# Patient Record
Sex: FEMALE
[De-identification: names, ages, dates, MRNs, and addresses within clinical notes are randomized per-mention and may not be internally consistent; named-entity substitution may affect disease eponyms.]

---

## 2020-09-09 ENCOUNTER — HOSPITAL ENCOUNTER (OUTPATIENT)
Dept: HOSPITAL 88 - ER | Age: 65
Setting detail: OBSERVATION
LOS: 2 days | Discharge: HOME | End: 2020-09-11
Attending: INTERNAL MEDICINE | Admitting: INTERNAL MEDICINE
Payer: SELF-PAY

## 2020-09-09 VITALS — HEIGHT: 64 IN | BODY MASS INDEX: 20.34 KG/M2 | WEIGHT: 119.12 LBS

## 2020-09-09 DIAGNOSIS — K21.9: ICD-10-CM

## 2020-09-09 DIAGNOSIS — K76.0: ICD-10-CM

## 2020-09-09 DIAGNOSIS — K44.9: ICD-10-CM

## 2020-09-09 DIAGNOSIS — B19.20: ICD-10-CM

## 2020-09-09 DIAGNOSIS — K31.89: ICD-10-CM

## 2020-09-09 DIAGNOSIS — R13.10: ICD-10-CM

## 2020-09-09 DIAGNOSIS — I10: ICD-10-CM

## 2020-09-09 DIAGNOSIS — Z11.59: ICD-10-CM

## 2020-09-09 DIAGNOSIS — K29.70: Primary | ICD-10-CM

## 2020-09-09 LAB
ALBUMIN SERPL-MCNC: 4.8 G/DL (ref 3.5–5)
ALBUMIN/GLOB SERPL: 1 {RATIO} (ref 0.8–2)
ALP SERPL-CCNC: 93 IU/L (ref 40–150)
ALT SERPL-CCNC: 42 IU/L (ref 0–55)
AMYLASE SERPL-CCNC: 42 U/L (ref 25–125)
ANION GAP SERPL CALC-SCNC: 24.7 MMOL/L (ref 8–16)
BACTERIA URNS QL MICRO: (no result) /HPF
BASOPHILS # BLD AUTO: 0 10*3/UL (ref 0–0.1)
BASOPHILS NFR BLD AUTO: 0.1 % (ref 0–1)
BILIRUB UR QL: NEGATIVE
BUN SERPL-MCNC: < 5 MG/DL (ref 7–26)
BUN/CREAT SERPL: 6 (ref 6–25)
CALCIUM SERPL-MCNC: 10.8 MG/DL (ref 8.4–10.2)
CHLORIDE SERPL-SCNC: 81 MMOL/L (ref 98–107)
CK MB SERPL-MCNC: 1.1 NG/ML (ref 0–5)
CK SERPL-CCNC: 136 IU/L (ref 29–168)
CLARITY UR: CLEAR
CO2 SERPL-SCNC: 24 MMOL/L (ref 22–29)
COLOR UR: YELLOW
DEPRECATED NEUTROPHILS # BLD AUTO: 6.2 10*3/UL (ref 2.1–6.9)
DEPRECATED RBC URNS MANUAL-ACNC: (no result) /HPF (ref 0–5)
EGFRCR SERPLBLD CKD-EPI 2021: > 60 ML/MIN (ref 60–?)
EOSINOPHIL # BLD AUTO: 0 10*3/UL (ref 0–0.4)
EOSINOPHIL NFR BLD AUTO: 0 % (ref 0–6)
EPI CELLS URNS QL MICRO: (no result) /LPF
ERYTHROCYTE [DISTWIDTH] IN CORD BLOOD: 12.9 % (ref 11.7–14.4)
GLOBULIN PLAS-MCNC: 4.7 G/DL (ref 2.3–3.5)
GLUCOSE SERPLBLD-MCNC: 177 MG/DL (ref 74–118)
HCT VFR BLD AUTO: 43.4 % (ref 34.2–44.1)
HGB BLD-MCNC: 15.3 G/DL (ref 12–16)
KETONES UR QL STRIP.AUTO: NEGATIVE
LEUKOCYTE ESTERASE UR QL STRIP.AUTO: NEGATIVE
LIPASE SERPL-CCNC: 15 U/L (ref 8–78)
LYMPHOCYTES # BLD: 1.5 10*3/UL (ref 1–3.2)
LYMPHOCYTES NFR BLD AUTO: 16.7 % (ref 18–39.1)
MCH RBC QN AUTO: 33 PG (ref 28–32)
MCHC RBC AUTO-ENTMCNC: 35.3 G/DL (ref 31–35)
MCV RBC AUTO: 93.5 FL (ref 81–99)
MONOCYTES # BLD AUTO: 1 10*3/UL (ref 0.2–0.8)
MONOCYTES NFR BLD AUTO: 11.1 % (ref 4.4–11.3)
NEUTS SEG NFR BLD AUTO: 71.8 % (ref 38.7–80)
NITRITE UR QL STRIP.AUTO: NEGATIVE
PLAT MORPH BLD: (no result)
PLATELET # BLD AUTO: 129 X10E3/UL (ref 140–360)
PLATELET # BLD EST: (no result) 10*3/UL
POTASSIUM SERPL-SCNC: 2.7 MMOL/L (ref 3.5–5.1)
PROT UR QL STRIP.AUTO: (no result)
RBC # BLD AUTO: 4.64 X10E6/UL (ref 3.6–5.1)
RBC MORPH BLD: NORMAL
SODIUM SERPL-SCNC: 127 MMOL/L (ref 136–145)
SP GR UR STRIP: 1.01 (ref 1.01–1.02)
UROBILINOGEN UR STRIP-MCNC: 0.2 MG/DL (ref 0.2–1)
WBC #/AREA URNS HPF: (no result) /HPF (ref 0–5)

## 2020-09-09 PROCEDURE — 93005 ELECTROCARDIOGRAM TRACING: CPT

## 2020-09-09 PROCEDURE — 82150 ASSAY OF AMYLASE: CPT

## 2020-09-09 PROCEDURE — 74177 CT ABD & PELVIS W/CONTRAST: CPT

## 2020-09-09 PROCEDURE — 99284 EMERGENCY DEPT VISIT MOD MDM: CPT

## 2020-09-09 PROCEDURE — 83690 ASSAY OF LIPASE: CPT

## 2020-09-09 PROCEDURE — 70450 CT HEAD/BRAIN W/O DYE: CPT

## 2020-09-09 PROCEDURE — 43450 DILATE ESOPHAGUS 1/MULT PASS: CPT

## 2020-09-09 PROCEDURE — 82550 ASSAY OF CK (CPK): CPT

## 2020-09-09 PROCEDURE — 43239 EGD BIOPSY SINGLE/MULTIPLE: CPT

## 2020-09-09 PROCEDURE — 80053 COMPREHEN METABOLIC PANEL: CPT

## 2020-09-09 PROCEDURE — 88305 TISSUE EXAM BY PATHOLOGIST: CPT

## 2020-09-09 PROCEDURE — 81001 URINALYSIS AUTO W/SCOPE: CPT

## 2020-09-09 PROCEDURE — 85025 COMPLETE CBC W/AUTO DIFF WBC: CPT

## 2020-09-09 PROCEDURE — 85007 BL SMEAR W/DIFF WBC COUNT: CPT

## 2020-09-09 PROCEDURE — 85027 COMPLETE CBC AUTOMATED: CPT

## 2020-09-09 PROCEDURE — 82553 CREATINE MB FRACTION: CPT

## 2020-09-09 PROCEDURE — 88312 SPECIAL STAINS GROUP 1: CPT

## 2020-09-09 PROCEDURE — 80048 BASIC METABOLIC PNL TOTAL CA: CPT

## 2020-09-09 PROCEDURE — 84484 ASSAY OF TROPONIN QUANT: CPT

## 2020-09-09 PROCEDURE — 36415 COLL VENOUS BLD VENIPUNCTURE: CPT

## 2020-09-09 NOTE — XMS REPORT
Continuity of Care Document

                             Created on: 09/10/2020



REYES, JOSEPHINE MORENO

External Reference #: [id number]

: 1955

Sex: Female



Demographics





                          Address                   [address] [place]  [address]

 

                          Home Phone                [phone]

 

                          Preferred Language        English

 

                          Marital Status            Unknown

 

                          Holiness Affiliation     Unknown

 

                          Race                      Unknown

 

                                        Additional Race(s) 

 

 

                          Ethnic Group              Unknown





Author





                          Author                    [facility]

 

                          Organization              [facility]

 

                          Address                   [address]

[place]  [address]



 

                          Phone                     Unavailable







Support





                Name            Relationship    Address         Phone

 

                    ELMER CUMMINGS      PRS                 [address] [place] [address]  [phone]

 

                    ELMER CUMMINGS      PRS                 [address] [place] [address]  [address] [phone]







Care Team Providers





                    Care Team Member Name Role                Phone

 

                    INOCENCIA BISHOP             Unavailable







Payers





           Payer Name Policy Type Policy Number Effective Date Expiration Date S

ource







Problems

This patient has no known problems.



Allergies, Adverse Reactions, Alerts





        Allergy Name Allergy Type Status  Severity Reaction(s) Onset Date Inacti

ve Date 

Treating Clinician        Comments                  Source

 

       No Known Allergies DA     Active U             2020 00:00:00       

               [facility]

 

       No Known Allergies DA     Active U             2019 00:00:00       

               [facility]

 

       No Known Allergies DA     Active U             2019 00:00:00       

               [facility]

 

       No Known Allergies DA     Active U             2019 00:00:00       

               [facility]

 

       No Known Allergies DA     Active U             2012 00:00:00       

               [facility]







Medications

This patient has no known medications.



Procedures

This patient has no known procedures.



Results





           Test Description Test Time  Test Comments Results    Result Comments 

Source

 

                CT ABDOMEN/PELVIS W 2020 21:09:00                         

                              

                                               [facility]
                       [address] [place], [place] [address]
     Patient Name: REYES,JOSEPHINE MORENO                                MR #: 
[id number]                  : 1955                                   
Age/Sex: 64/F  Acct #: [id number]                              Req #: [id number]
[id number]  Adm Physician:                                                      
Ordered by: SUSY BISHOP MD                         Report #: [id number]
       Location: ER                                      Room/Bed:              
    
________________________________________________________________________________

___________________    Procedure: [id number] CT/CT ABDOMEN/PELVIS W  Exam Date: 
20                            Exam Time:                              
                REPORT STATUS: Signed    EXAM: CT Abdomen and Pelvis WITH 
contrast     INDICATION: ABD PAIN, VOMITING, EVAL FOR SBO, COLITIS, 
DIVERTICULITIS      COMPARISON: None.   TECHNIQUE: Abdomen and pelvis were 
scanned utilizing a multidetector helical   scanner from the lung base to the p
ubic symphysis after administration of IV   contrast. Coronal and sagittal 
reformations were obtained. Routine protocol was   performed. Scan was performed
when during portal venous phase.            IV CONTRAST: 100 mL of Isovue 370   
    ORAL CONTRAST: None                  COMPLICATIONS: None      RADIATION 
DOSE:        Total DLP: 444.36 mGy*cm        Estimated effective dose: (DLP x 
0.015 x size factor) mSv        CTDIvol has been reviewed. It is below the 
limits set by the Radiation   Protocol Committee (RPC).        Dose modulation, 
iterative reconstruction, and/or weight based adjustment   of the mA/kV was 
utilized to reduce the radiation dose to as low as reasonably   achievable.     
 FINDINGS:      LINES and TUBES: None.      LOWER THORAX:  Solitary pneumatocele
in the left lower lobe. The lung bases and   base of the heart are otherwise 
normal.      HEPATOBILIARY: The liver is diffuse hypodense compared to the 
spleen,   consistent with diffuse hepatic diffuse hepatic steatosis.  No focal 
hepatic   lesions. No biliary ductal dilation.       GALLBLADDER: No radio-
opaque stones or sludge.  No wall thickening.      SPLEEN: No splenomegaly.     
 PANCREAS: No focal masses or ductal dilatation.        ADRENALS: No adrenal 
nodules          KIDNEYS/URETERS: Kidneys enhance symmetrically.  No 
hydronephrosis. No cystic   or solid mass lesions.  No stones.      GI TRACT: 
There is a small hiatal hernia. There is diverticulosis without   evidence of 
diverticulitis. No abnormal distention, wall thickening, or   evidence of bowel 
obstruction.       Appendix is normal.      PELVIC ORGANS/BLADDER: There is a 
2.0 x 1.6 cm cyst in the right adnexa. The   urinary bladder is decompressed, 
however no gross abnormality identified.       LYMPH NODES: No lymphadenopathy. 
    VESSELS: Scattered mild arterial vascular calcifications.      PERITONEUM / 
RETROPERITONEUM: No free air or fluid.      BONES: There are degenerative 
changes in the spine.      SOFT TISSUES: Unremarkable.                  IMPR
ESSION:    1.  No acute abdominopelvic abnormality to explain the patient's 
symptoms where   identified.   2.  Hepatic steatosis.    3.  A 2 cm cyst in the 
right adnexa which can be further evaluated with   dedicated pelvic ultrasound. 
    Signed by: Chelsea Angeles MD on 2020 9:29 PM        Dictated By: CHELSEA ANGELES MD  Electronically Signed By: CHELSEA ANGELES MD on 20  
Transcribed By: KIMI on 20       COPY TO:   SUSY BISHOP MD                                                   

 

                CT BRAIN WO     2020 19:20:00                             

                                  

                                       [place] [address]      
Patient Name: REYES,JOSEPHINE MORENO                                MR #: 
[id number]                  : 1955                                   
Age/Sex: 64/F  Acct #: [id number]                              Req #: [id number]
[id number]  Adm Physician:                                                      
Ordered by: SUSY BISHOP MD                         Report #: [id number]
       Location: ER                                      Room/Bed:              
    
________________________________________________________________________________

___________________    Procedure:  CT/CT BRAIN WO  Exam Date: 20 
                          Exam Time:                                        
      REPORT STATUS: Signed    CT BRAIN WO      HISTORY: Dizziness, vomiting    
 COMPARISON:  None.      Technique:    Noncontrast axial scans were obtained 
from skull base to the vertex.  Coronal   and sagittal reconstructions obtained 
from the axial data.  One or more of the   following dose reduction techniques 
were used: Automated exposure control,   adjustment of the mA and/or kV 
according to patient size, and/or utilization of   iterative reconstruction 
technique.      DISCUSSION:      Scalp/Skull: Unremarkable.   Brain sulci: 
Mildly prominent.   Ventricles: Compensatory dilatation.   Extra-axial spaces: 
No masses or fluid collections. Carotid siphon   calcifications are present.    
 Parenchyma:    Mild bilateral deep white matter hypodensity is likely chronic 
microvascular   ischemic change.    Otherwise, no masses, hemorrhage, or large 
vascular territory acute infarct.      Dural sinuses:  No abnormal densities.   
Sellar/Suprasellar region: Intact.   Skull base: Intact.   Incidental findings: 
None.      IMPRESSION:   1.  No acute intracranial abnormalities.   2.  Mild 
supratentorial chronic microvascular ischemic change. Mild generalized   
cerebral volume loss.         Signed by: Dr. Wojciech Gomez M.D. on 2020 
7:23 PM        Dictated By: WOJCIECH GOMEZ MD  Electronically Signed By: 
WOJCIECH GOMEZ MD on 20  Transcribed By: KIMI on 20  
    COPY TO:   SUSY BISHOP MD                                     

 

                DUODENUM,BIOPSY 2020 16:00:00                 

--------------------------------------------------------------------------------

------------RUN DATE: 20                         [facility]           
              PAGE 1   RUN TIME: 1600                            Specimen 
Inquiry                    RUN USER: INTERFACE                                  
                        
----------------------------------------------------------------
----------------------------PATIENT: REYES,JOSEPHINE               ACCT #: 
[id number] LOC:  SIMÓNBHUMIKA      U #: [id number]                                   
   AGE/SX: 64/F         ROOM:      [id number] DR:  Joni Martinez MD          :    [birth year]     BED:  B          DIS: 20                 
                     STATUS: DIS IN       TLOC:           
----------------------------
---------------------------------------------------------------- SPEC #: 
[id number]     RECD:      STATUS:  MASON STAPLES #: 
[id number]                           JENNIFER:      SUBM DR: 
Joni Martinez MD           ENTERED:      SP TYPE: BX DUODEN    
 OTHR DR: No Primary or Family Physician                                        
                   Nomi Junior MDORDERED:  GROSS                                
                                             COPIES TO:   No Primary or Family 
Physician    Nomi Junior MD   [address] [place] [address]   
[phone]    Joni Martinez MD   [address] [place] [address]   
[phone] PROCEDURES: GROSS () TISSUES:           1. DUODENUM, 
NOS - BX         2. DUODENUM, NOS - BULD POLYP BX         3. GASTRIC CORPUS - BX
        4. ILEUM, NOS - TERMINAL BX         5. ASCENDING COLON - POLYP BX       
 CLINICAL HISTORY    COLLECTION DATE: 20       ABDOMINAL PAIN         FINAL 
DIAGNOSIS    Duodenum, biopsy:        DUODENAL MUCOSA, NO PATHOLOGIC ALTERATION 
         Duodenal bulb, polyp, biopsy:        DUODENAL MUCOSA, NO PATHOLOGIC 
ALTERATION        CLINICAL CORRELATION RECOMMENDED           Gastric biopsy:    
   CHRONIC INACTIVE GASTRITIS         NO INTESTINAL METAPLASIA SEEN        
NEGATIVE FOR HELICOBACTER PYLORI BY GIEMSA STAIN                                
** CONTINUED ON NEXT PAGE ** 
--------------------------------------------------------------------------------

------------RUN DATE: 20                         [facility]           
              PAGE 2   RUN TIME: 1600                            Specimen 
Inquiry                    RUN USER: INTERFACE                                  
                        ----------------------------------------------
----------------------------------------------SPEC #: [id number]    PATIENT:
REYES,JOSEPHINE                #[id number]  
(Continued)----------------------------------------------------------------
----------------------------         FINAL DIAGNOSIS           (Continued)      
  NEGATIVE FOR MALIGNANCY       Terminal ileum, biopsy:        LYMPHOID 
AGGREGATE, SMALL INTESTINAL MUCOSA        MUCOSA OTHERWISE UNREMARKABLE        
NEGATIVE FOR MALIGNANCY       Ascending colon, polyp, biopsy:        COLONIC 
MUCOSA, NO PATHOLOGIC ALTERATION        CLINICAL CORRELATION RECOMMENDED        
      [facility]/aleksey   D   [id number], [id number]           MACROSCOPIC    The first specimen 
is received in formalin, labeled with the patient's name,   identified as 
"duodenum biopsy", and consists of tan biopsy material measuring   0.4 cm in 
aggregate.       The second specimen is received in formalin, labeled with the 
patient's name,   identified as "duodenal bulb polyp", and consists of a tan 
biopsy fragment   measuring 0.3 cm.       The third specimen is received in 
formalin, labeled with the patient's name,   identified as "gastric biopsy", and
consists of multiple tan biopsy fragments   measuring 0.4 cm in aggregate.      
The fourth specimen is received in formalin, labeled with the patient's name,   
identified as "terminal ileum biopsy", and consists of tan biopsy material   
measuring 0.3 cm in aggregate.       The fifth specimen is received in formalin,
labeled with the patient's name,   identified as "ascending polyp", and consists
of a tan biopsy fragment   measuring 0.35 cm.       GROSS PERFORMED AT [facility]   [address] [place] [address], [place] [address] [phone]                                     ** 
CONTINUED ON NEXT PAGE ** -------------------
-------------------------------------------------------------------------RUN 
DATE: 20                         [facility]                          
PAGE 3   RUN TIME: 1600                            Specimen Inquiry             
      RUN USER: INTERFACE                                                       
   
--------------------------------------------------------------------------------

------------SPEC #: [id number]    PATIENT: REYES,JOSEPHINE                
#[id number]  
(Continued)---------------------------------------------------------------------

-----------------------            MICROSCOPIC    All of the stains, including 
any controls performed, stain   appropriately.       MICROSCOPIC PERFORMED AT 
[facility]   [address] [place] [address] [phone]         PERFORMING SITE    Diagnosis 
performed at:        [facility]        [address] [facility], [place] [address] [phone]--------------------------------------------------------------------

------------------------ Signed SIGNATURE ON FILE                        
Neema Khan MD 20 1600 
--------------------------------------------------------------------------------

------------                                                             ** END 
OF REPORT **                                         

 

                - HEPA IMAG INCL GB W PHA 2020 09:19:00                  F

[facility]

                  Tensed: B   St: ADM FAX:         Azucena Obrien 
[phone]   
------------------------------------------------------------------------------- 
Name:   REYES,JOSEPHINE                  [facility]                     :
1955  Age/S: 64/F           [address] [facility]                Unit #: 
[id number]      Loc: [place]  [address]              Phys: 
Azucena Obrien                                                Acct: 
[id number] Dis Date:               Status: ADM IN                             
   PHONE #: [phone]     Exam Date:     2020                 
 FAX #: [phone]     Reason: abd pain, n/v, questionable cholelithiasis     
   EXAMS:                                               CPT CODE:      [id number]
HEPA IMAG INCL GB W PHA                    [id number]                    HISTORY: 
Abdominal pain with nausea and vomiting and cholelithiasis.               
COMPARISON: Ultrasound from May 29, 2020.               Location: [facility].          
    HIDA scan: 3.4 mCi of technetium 99m Choletec and 1.1 mcg of CCK.       
Sequential images obtained.               Homogeneous uptake within the liver. 
Excretion into the biliary system       as well as the gallbladder and small 
bowel. Ejection fraction       calculated to 67% at 12 1/2 minutes which is well
within the normal       range of greater than 35%.                 IMPRESSION:  
                Normal ejection fraction of 67%.          ** Electronically 
Signed by DANNA No on 2020 at 0919 **                      
Reported and signed by: Star No M.D.                      CC: 
Thea Meraz MD; Azucena Obrien                                    
                                                       Technologist: Blaire Robledo RT(N)                                    Trnscrd Date/Time/By: 2020 
(09) : By: [email or web address]           Orig Print D/T: S: 2020 (1164)           
             PAGE  1                       Signed Report                        
                                                     

 

                    Novel Coronavirus 2019 Inhouse 2020 09:00:00   

 

                                        Test Item

 

             Novel Coronavirus 2019 Inhouse (test code = COVNONPUI) Negative    

 Negative                   





Testing Criteria:     Preprocedure ScreeningNovel Coronavirus 2019 Inhouse
2020 08:59:00* 



             Test Item    Value        Reference Range Interpretation Comments

 

             Novel Coronavirus 2019 Inhouse (test code = COVNONPUI) Negative    

 Negative                   





Testing Criteria:     Preprocedure ScreeningCBC W/AUTO [id number] 09:22:00* 



             Test Item    Value        Reference Range Interpretation Comments

 

             WHITE BLOOD CELL (test code = WBC) 5.3 K/mm3    4.5-12.5     N     

        

 

             RED BLOOD CELL (test code = RBC) 3.85 mill/mm3 3.7-5.2      N      

       

 

             HEMOGLOBIN (test code = HGB) 12.6 gram/dL 11.5-15.5    N           

  

 

             HEMATOCRIT (test code = HCT) 38.7 %       36.0-46.0    N           

  

 

             MEAN CELL VOLUME (test code = MCV) 100.5 fL     80-98        H     

        

 

             MEAN CELL HGB (test code = MCH) 32.7 picogram 27.0-33.0    N       

      

 

             MEAN CELL HGB CONCETRATION (test code = MCHC) 32.6 gram/dL 33.0-36.

0    L             

 

             RED CELL DISTRIBUTION WIDTH (test code = RDW) 14.4 %       11.6-16.

2    N             

 

             RED CELL DISTRIBUTION WIDTH SD (test code = RDW-SD) 53.4 fL      37

.0-51.0    H             

 

             PLATELET COUNT (test code = PLT) 67 K/mm3     150-450      L       

      

 

             MEAN PLATELET VOLUME (test code = MPV) 11.5 fL      6.7-11.0     H 

            

 

             NEUTROPHIL % (test code = NT%) 57.2 %       39.0-69.0    N         

    

 

             IMMATURE GRANULOCYTE % (test code = IG%) 0.2 %        0.0-5.0      

N             

 

             LYMPHOCYTE % (test code = LY%) 29.6 %       25.0-55.0    N         

    

 

             MONOCYTE % (test code = MO%) 12.6 %       0.0-10.0     H           

  

 

             EOSINOPHIL % (test code = EO%) 0.0 %        0.0-5.0      N         

    

 

             BASOPHIL % (test code = BA%) 0.4 %        0.0-1.0      N           

  

 

             NUCLEATED RBC % (test code = NRBC%) 0.0 %        0-0          N    

         

 

             NEUTROPHIL # (test code = NT#) 3.04 K/mm3   1.8-7.7      N         

    

 

             IMMATURE GRANULOCYTE # (test code = IG#) 0.01 x10 3/uL 0-0.03      

 N             

 

             LYMPHOCYTE # (test code = LY#) 1.57 K/mm3   1.0-5.0      N         

    

 

             MONOCYTE # (test code = MO#) 0.67 K/mm3   0-0.8        N           

  

 

             EOSINOPHIL # (test code = EO#) 0.00 K/mm3   0.0-0.5      N         

    

 

             BASOPHIL # (test code = BA#) 0.02 K/mm3   0.0-0.2      N           

  

 

             NUCLEATED RBC # (test code = NRBC#) 0.00 K/mm3   0.0-0.1      N    

         

 

             MANUAL DIFF REQUIRED (test code = MDIFF) NO, ONLY SCAN NEEDED      

                      





DIFFERENTIAL [id number] 09:22:00* 



             Test Item    Value        Reference Range Interpretation Comments

 

             STAIN ACCEPTABILITY (test code = STN ACCEPTABLE) STAIN ACCEPTABLE  

                          

 

             ANISOCYTOSIS (test code = ANISO) 1+                                

      

 

             PLATELET ESTIMATE (test code = PLTEST) DECREASED                   

            

 

             PLATELET MORPHOLOGY (test code = PLTMORPH) NORMAL                  

                





BASIC METABOLIC [id number] 05:25:00* 



             Test Item    Value        Reference Range Interpretation Comments

 

             SODIUM (test code = NA) 136 mmol/L   136-145      N             

 

             POTASSIUM (test code = K) 3.5 mmol/L   3.5-5.1      N             

 

             CHLORIDE (test code = CL) 104.0 mmol/L        N             

 

             CARBON DIOXIDE (test code = CO2) 23.0 mmol/L  21-32        N       

      

 

             ANION GAP (test code = GAP) 12.5         10-20        N            

 

 

             GLUCOSE (test code = GLU) 115 mg/dL           H             

 

             BLOOD UREA NITROGEN (test code = BUN) 2 mg/dL      7-18         L  

           

 

             GLOMERULAR FILTRATION RATE (test code = GFR) > 60 mL/min  >=60     

                 Estimated GFR by

using Modified MDRD formula.Chronic kidney disease is defined as either kidney 
damageor GFR <60 mL/min/1.73 m2 for >3 months.

 

             CREATININE (test code = CREAT) 0.70 mg/dL   0.55-1.02    N         

   **Note change in reference

range due to change in reagent.**

 

             BUN/CREATININE RATIO (test code = BUN/CREA) 2.9          10-20     

   L             

 

             CALCIUM (test code = CA) 8.6 mg/dL    8.5-10.1     N             





CBC W/AUTO [id number] 05:24:00* 



             Test Item    Value        Reference Range Interpretation Comments

 

             WHITE BLOOD CELL (test code = WBC) 5.3 K/mm3    4.5-12.5     N     

        

 

             RED BLOOD CELL (test code = RBC) 3.85 mill/mm3 3.7-5.2      N      

       

 

             HEMOGLOBIN (test code = HGB) 12.6 gram/dL 11.5-15.5    N           

  

 

             HEMATOCRIT (test code = HCT) 38.7 %       36.0-46.0    N           

  

 

             MEAN CELL VOLUME (test code = MCV) 100.5 fL     80-98        H     

        

 

             MEAN CELL HGB (test code = MCH) 32.7 picogram 27.0-33.0    N       

      

 

             MEAN CELL HGB CONCETRATION (test code = MCHC) 32.6 gram/dL 33.0-36.

0    L             

 

             RED CELL DISTRIBUTION WIDTH (test code = RDW) 14.4 %       11.6-16.

2    N             

 

             RED CELL DISTRIBUTION WIDTH SD (test code = RDW-SD) 53.4 fL      37

.0-51.0    H             

 

             PLATELET COUNT (test code = PLT) 67 K/mm3     150-450      L       

      

 

             MEAN PLATELET VOLUME (test code = MPV) 11.5 fL      6.7-11.0     H 

            

 

             NEUTROPHIL % (test code = NT%) 57.2 %       39.0-69.0    N         

    

 

             IMMATURE GRANULOCYTE % (test code = IG%) 0.2 %        0.0-5.0      

N             

 

             LYMPHOCYTE % (test code = LY%) 29.6 %       25.0-55.0    N         

    

 

             MONOCYTE % (test code = MO%) 12.6 %       0.0-10.0     H           

  

 

             EOSINOPHIL % (test code = EO%) 0.0 %        0.0-5.0      N         

    

 

             BASOPHIL % (test code = BA%) 0.4 %        0.0-1.0      N           

  

 

             NUCLEATED RBC % (test code = NRBC%) 0.0 %        0-0          N    

         

 

             NEUTROPHIL # (test code = NT#) 3.04 K/mm3   1.8-7.7      N         

    

 

             IMMATURE GRANULOCYTE # (test code = IG#) 0.01 x10 3/uL 0-0.03      

 N             

 

             LYMPHOCYTE # (test code = LY#) 1.57 K/mm3   1.0-5.0      N         

    

 

             MONOCYTE # (test code = MO#) 0.67 K/mm3   0-0.8        N           

  

 

             EOSINOPHIL # (test code = EO#) 0.00 K/mm3   0.0-0.5      N         

    

 

             BASOPHIL # (test code = BA#) 0.02 K/mm3   0.0-0.2      N           

  

 

             NUCLEATED RBC # (test code = NRBC#) 0.00 K/mm3   0.0-0.1      N    

         

 

             MANUAL DIFF REQUIRED (test code = MDIFF) NO, ONLY SCAN NEEDED      

                      





DIFFERENTIAL [id number] 05:24:00* 



             Test Item    Value        Reference Range Interpretation Comments

 

             STAIN ACCEPTABILITY (test code = STN ACCEPTABLE)                   

                      

 

             CABOT RINGS (test code = CAB)                                      

   

 

             MORPHOLOGY COMMENT (test code = MOC)                               

          

 

             PLATELET ESTIMATE (test code = PLTEST)                             

            

 

             PLATELET MORPHOLOGY (test code = PLTMORPH)                         

                





CBC W/AUTO [id number] 05:24:00* 



             Test Item    Value        Reference Range Interpretation Comments

 

             WHITE BLOOD CELL (test code = WBC) 5.3 K/mm3    4.5-12.5     N     

        

 

             RED BLOOD CELL (test code = RBC) 3.85 mill/mm3 3.7-5.2      N      

       

 

             HEMOGLOBIN (test code = HGB) 12.6 gram/dL 11.5-15.5    N           

  

 

             HEMATOCRIT (test code = HCT) 38.7 %       36.0-46.0    N           

  

 

             MEAN CELL VOLUME (test code = MCV) 100.5 fL     80-98        H     

        

 

             MEAN CELL HGB (test code = MCH) 32.7 picogram 27.0-33.0    N       

      

 

             MEAN CELL HGB CONCETRATION (test code = MCHC) 32.6 gram/dL 33.0-36.

0    L             

 

             RED CELL DISTRIBUTION WIDTH (test code = RDW) 14.4 %       11.6-16.

2    N             

 

             RED CELL DISTRIBUTION WIDTH SD (test code = RDW-SD) 53.4 fL      37

.0-51.0    H             

 

             PLATELET COUNT (test code = PLT) 67 K/mm3     150-450      L       

      

 

             MEAN PLATELET VOLUME (test code = MPV) 11.5 fL      6.7-11.0     H 

            

 

             NEUTROPHIL % (test code = NT%) 57.2 %       39.0-69.0    N         

    

 

             IMMATURE GRANULOCYTE % (test code = IG%) 0.2 %        0.0-5.0      

N             

 

             LYMPHOCYTE % (test code = LY%) 29.6 %       25.0-55.0    N         

    

 

             MONOCYTE % (test code = MO%) 12.6 %       0.0-10.0     H           

  

 

             EOSINOPHIL % (test code = EO%) 0.0 %        0.0-5.0      N         

    

 

             BASOPHIL % (test code = BA%) 0.4 %        0.0-1.0      N           

  

 

             NUCLEATED RBC % (test code = NRBC%) 0.0 %        0-0          N    

         

 

             NEUTROPHIL # (test code = NT#) 3.04 K/mm3   1.8-7.7      N         

    

 

             IMMATURE GRANULOCYTE # (test code = IG#) 0.01 x10 3/uL 0-0.03      

 N             

 

             LYMPHOCYTE # (test code = LY#) 1.57 K/mm3   1.0-5.0      N         

    

 

             MONOCYTE # (test code = MO#) 0.67 K/mm3   0-0.8        N           

  

 

             EOSINOPHIL # (test code = EO#) 0.00 K/mm3   0.0-0.5      N         

    

 

             BASOPHIL # (test code = BA#) 0.02 K/mm3   0.0-0.2      N           

  

 

             NUCLEATED RBC # (test code = NRBC#) 0.00 K/mm3   0.0-0.1      N    

         

 

             MANUAL DIFF REQUIRED (test code = MDIFF) NO, ONLY SCAN NEEDED      

                      





DIFFERENTIAL [id number] 05:24:00* 



             Test Item    Value        Reference Range Interpretation Comments

 

             STAIN ACCEPTABILITY (test code = STN ACCEPTABLE)                   

                      

 

             MORPHOLOGY COMMENT (test code = MOC)                               

          

 

             PLATELET ESTIMATE (test code = PLTEST)                             

            

 

             PLATELET MORPHOLOGY (test code = PLTMORPH)                         

                





CBC W/AUTO [id number] 05:24:00* 



             Test Item    Value        Reference Range Interpretation Comments

 

             WHITE BLOOD CELL (test code = WBC) 5.3 K/mm3    4.5-12.5     N     

        

 

             RED BLOOD CELL (test code = RBC) 3.85 mill/mm3 3.7-5.2      N      

       

 

             HEMOGLOBIN (test code = HGB) 12.6 gram/dL 11.5-15.5    N           

  

 

             HEMATOCRIT (test code = HCT) 38.7 %       36.0-46.0    N           

  

 

             MEAN CELL VOLUME (test code = MCV) 100.5 fL     80-98        H     

        

 

             MEAN CELL HGB (test code = MCH) 32.7 picogram 27.0-33.0    N       

      

 

             MEAN CELL HGB CONCETRATION (test code = MCHC) 32.6 gram/dL 33.0-36.

0    L             

 

             RED CELL DISTRIBUTION WIDTH (test code = RDW) 14.4 %       11.6-16.

2    N             

 

             RED CELL DISTRIBUTION WIDTH SD (test code = RDW-SD) 53.4 fL      37

.0-51.0    H             

 

             PLATELET COUNT (test code = PLT) 67 K/mm3     150-450      L       

      

 

             MEAN PLATELET VOLUME (test code = MPV) 11.5 fL      6.7-11.0     H 

            

 

             NEUTROPHIL % (test code = NT%) 57.2 %       39.0-69.0    N         

    

 

             IMMATURE GRANULOCYTE % (test code = IG%) 0.2 %        0.0-5.0      

N             

 

             LYMPHOCYTE % (test code = LY%) 29.6 %       25.0-55.0    N         

    

 

             MONOCYTE % (test code = MO%) 12.6 %       0.0-10.0     H           

  

 

             EOSINOPHIL % (test code = EO%) 0.0 %        0.0-5.0      N         

    

 

             BASOPHIL % (test code = BA%) 0.4 %        0.0-1.0      N           

  

 

             NUCLEATED RBC % (test code = NRBC%) 0.0 %        0-0          N    

         

 

             NEUTROPHIL # (test code = NT#) 3.04 K/mm3   1.8-7.7      N         

    

 

             IMMATURE GRANULOCYTE # (test code = IG#) 0.01 x10 3/uL 0-0.03      

 N             

 

             LYMPHOCYTE # (test code = LY#) 1.57 K/mm3   1.0-5.0      N         

    

 

             MONOCYTE # (test code = MO#) 0.67 K/mm3   0-0.8        N           

  

 

             EOSINOPHIL # (test code = EO#) 0.00 K/mm3   0.0-0.5      N         

    

 

             BASOPHIL # (test code = BA#) 0.02 K/mm3   0.0-0.2      N           

  

 

             NUCLEATED RBC # (test code = NRBC#) 0.00 K/mm3   0.0-0.1      N    

         

 

             MANUAL DIFF REQUIRED (test code = MDIFF) NO, ONLY SCAN NEEDED      

                      





DIFFERENTIAL [id number] 05:24:00* 



             Test Item    Value        Reference Range Interpretation Comments

 

             STAIN ACCEPTABILITY (test code = STN ACCEPTABLE)                   

                      

 

             MORPHOLOGY COMMENT (test code = MOC)                               

          

 

             PLATELET ESTIMATE (test code = PLTEST)                             

            

 

             PLATELET MORPHOLOGY (test code = PLTMORPH)                         

                





CBC W/AUTO [id number] 05:24:00* 



             Test Item    Value        Reference Range Interpretation Comments

 

             WHITE BLOOD CELL (test code = WBC) 5.3 K/mm3    4.5-12.5     N     

        

 

             RED BLOOD CELL (test code = RBC) 3.85 mill/mm3 3.7-5.2      N      

       

 

             HEMOGLOBIN (test code = HGB) 12.6 gram/dL 11.5-15.5    N           

  

 

             HEMATOCRIT (test code = HCT) 38.7 %       36.0-46.0    N           

  

 

             MEAN CELL VOLUME (test code = MCV) 100.5 fL     80-98        H     

        

 

             MEAN CELL HGB (test code = MCH) 32.7 picogram 27.0-33.0    N       

      

 

             MEAN CELL HGB CONCETRATION (test code = MCHC) 32.6 gram/dL 33.0-36.

0    L             

 

             RED CELL DISTRIBUTION WIDTH (test code = RDW) 14.4 %       11.6-16.

2    N             

 

             RED CELL DISTRIBUTION WIDTH SD (test code = RDW-SD) 53.4 fL      37

.0-51.0    H             

 

             PLATELET COUNT (test code = PLT) 67 K/mm3     150-450      L       

      

 

             MEAN PLATELET VOLUME (test code = MPV) 11.5 fL      6.7-11.0     H 

            

 

             NEUTROPHIL % (test code = NT%) 57.2 %       39.0-69.0    N         

    

 

             IMMATURE GRANULOCYTE % (test code = IG%) 0.2 %        0.0-5.0      

N             

 

             LYMPHOCYTE % (test code = LY%) 29.6 %       25.0-55.0    N         

    

 

             MONOCYTE % (test code = MO%) 12.6 %       0.0-10.0     H           

  

 

             EOSINOPHIL % (test code = EO%) 0.0 %        0.0-5.0      N         

    

 

             BASOPHIL % (test code = BA%) 0.4 %        0.0-1.0      N           

  

 

             NUCLEATED RBC % (test code = NRBC%) 0.0 %        0-0          N    

         

 

             NEUTROPHIL # (test code = NT#) 3.04 K/mm3   1.8-7.7      N         

    

 

             IMMATURE GRANULOCYTE # (test code = IG#) 0.01 x10 3/uL 0-0.03      

 N             

 

             LYMPHOCYTE # (test code = LY#) 1.57 K/mm3   1.0-5.0      N         

    

 

             MONOCYTE # (test code = MO#) 0.67 K/mm3   0-0.8        N           

  

 

             EOSINOPHIL # (test code = EO#) 0.00 K/mm3   0.0-0.5      N         

    

 

             BASOPHIL # (test code = BA#) 0.02 K/mm3   0.0-0.2      N           

  

 

             NUCLEATED RBC # (test code = NRBC#) 0.00 K/mm3   0.0-0.1      N    

         

 

             MANUAL DIFF REQUIRED (test code = MDIFF) NO, ONLY SCAN NEEDED      

                      





DIFFERENTIAL [id number] 05:24:00* 



             Test Item    Value        Reference Range Interpretation Comments

 

             STAIN ACCEPTABILITY (test code = STN ACCEPTABLE)                   

                      

 

             CABOT RINGS (test code = CAB)                                      

   

 

             MORPHOLOGY COMMENT (test code = MOC)                               

          

 

             PLATELET ESTIMATE (test code = PLTEST)                             

            

 

             PLATELET MORPHOLOGY (test code = PLTMORPH)                         

                





BASIC METABOLIC [id number] 05:14:00* 



             Test Item    Value        Reference Range Interpretation Comments

 

             SODIUM (test code = NA) 136 mmol/L   136-145      N             

 

             POTASSIUM (test code = K) 3.5 mmol/L   3.5-5.1      N             

 

             CHLORIDE (test code = CL) 104.0 mmol/L        N             

 

             CARBON DIOXIDE (test code = CO2)  mmol/L      21-32                

      

 

             ANION GAP (test code = GAP)              10-20                     

 

 

             GLUCOSE (test code = GLU)  mg/dL                            

 

             BLOOD UREA NITROGEN (test code = BUN)  mg/dL       7-18            

           

 

             GLOMERULAR FILTRATION RATE (test code = GFR)  mL/min      >=60     

                  

 

             CREATININE (test code = CREAT)  mg/dL       0.55-1.02              

    

 

             BUN/CREATININE RATIO (test code = BUN/CREA)              10-20     

                 

 

             CALCIUM (test code = CA)  mg/dL       8.5-10.1                   





CBC W/AUTO [id number] 11:24:00* 



             Test Item    Value        Reference Range Interpretation Comments

 

             WHITE BLOOD CELL (test code = WBC) 6.2 K/mm3    4.5-12.5     N     

        

 

             RED BLOOD CELL (test code = RBC) 4.12 mill/mm3 3.7-5.2      N      

       

 

             HEMOGLOBIN (test code = HGB) 13.5 gram/dL 11.5-15.5    N           

  

 

             HEMATOCRIT (test code = HCT) 40.6 %       36.0-46.0    N           

  

 

             MEAN CELL VOLUME (test code = MCV) 97.3 fL      80-98        N     

        

 

             MEAN CELL HGB (test code = MCH) 32.3 picogram 27.0-33.0    N       

      

 

             MEAN CELL HGB CONCETRATION (test code = MCHC) 33.2 gram/dL 33.0-36.

0    N             

 

             RED CELL DISTRIBUTION WIDTH (test code = RDW) 14.3 %       11.6-16.

2    N             

 

             RED CELL DISTRIBUTION WIDTH SD (test code = RDW-SD) 51.7 fL      37

.0-51.0    H             

 

             PLATELET COUNT (test code = PLT) 93 K/mm3     150-450      L       

      

 

             MEAN PLATELET VOLUME (test code = MPV) 10.3 fL      6.7-11.0     N 

            

 

             NEUTROPHIL % (test code = NT%) 70.6 %       39.0-69.0    H         

    

 

             IMMATURE GRANULOCYTE % (test code = IG%) 0.3 %        0.0-5.0      

N             

 

             LYMPHOCYTE % (test code = LY%) 16.0 %       25.0-55.0    L         

    

 

             MONOCYTE % (test code = MO%) 12.9 %       0.0-10.0     H           

  

 

             EOSINOPHIL % (test code = EO%) 0.0 %        0.0-5.0      N         

    

 

             BASOPHIL % (test code = BA%) 0.2 %        0.0-1.0      N           

  

 

             NUCLEATED RBC % (test code = NRBC%) 0.0 %        0-0          N    

         

 

             NEUTROPHIL # (test code = NT#) 4.36 K/mm3   1.8-7.7      N         

    

 

             IMMATURE GRANULOCYTE # (test code = IG#) 0.02 x10 3/uL 0-0.03      

 N             

 

             LYMPHOCYTE # (test code = LY#) 0.99 K/mm3   1.0-5.0      L         

    

 

             MONOCYTE # (test code = MO#) 0.80 K/mm3   0-0.8        N           

  

 

             EOSINOPHIL # (test code = EO#) 0.00 K/mm3   0.0-0.5      N         

    

 

             BASOPHIL # (test code = BA#) 0.01 K/mm3   0.0-0.2      N           

  

 

             NUCLEATED RBC # (test code = NRBC#) 0.00 K/mm3   0.0-0.1      N    

         

 

             MANUAL DIFF REQUIRED (test code = MDIFF) NO, ONLY SCAN NEEDED      

                      





DIFFERENTIAL [id number] 11:24:00* 



             Test Item    Value        Reference Range Interpretation Comments

 

             STAIN ACCEPTABILITY (test code = STN ACCEPTABLE) STAIN ACCEPTABLE  

                          

 

             ANISOCYTOSIS (test code = ANISO) 1+                                

      

 

             PLATELET ESTIMATE (test code = PLTEST) DECREASED                   

            

 

             PLATELET MORPHOLOGY (test code = PLTMORPH) NORMAL                  

                





[id number] 09:14:00* 



             Test Item    Value        Reference Range Interpretation Comments

 

             TROPONIN-I (test code = TROPI) <0.015 ng/mL 0-0.045      N         

    





COMMENTS TO PHLEBOTOMIST: COLLECT 3 HOURS AFTER PREVIOUS                        
  SAMPLECOMPREHENSIVE METABOLIC [id number] 09:11:00* 



             Test Item    Value        Reference Range Interpretation Comments

 

             SODIUM (test code = NA) 136 mmol/L   136-145      N             

 

             POTASSIUM (test code = K) 2.7 mmol/L   3.5-5.1      LL           Re

sults called to [id number] by 

V.[email or web address] [id number] results verified and read back by Nurse? Y

 

             CHLORIDE (test code = CL) 102.0 mmol/L        N             

 

             CARBON DIOXIDE (test code = CO2) 19.0 mmol/L  21-32        L       

      

 

             ANION GAP (test code = GAP) 17.7         10-20        N            

 

 

             GLUCOSE (test code = GLU) 167 mg/dL           H             

 

             BLOOD UREA NITROGEN (test code = BUN) 4 mg/dL      7-18         L  

           

 

             GLOMERULAR FILTRATION RATE (test code = GFR) > 60 mL/min  >=60     

                 Estimated GFR by

using Modified MDRD formula.Chronic kidney disease is defined as either kidney 
damageor GFR <60 mL/min/1.73 m2 for >3 months.

 

             CREATININE (test code = CREAT) 0.80 mg/dL   0.55-1.02    N         

   **Note change in reference

range due to change in reagent.**

 

             BUN/CREATININE RATIO (test code = BUN/CREA) 5.0          10-20     

   L             

 

             TOTAL PROTEIN (test code = PROT) 10.4 gram/dL 6.4-8.2      H       

      

 

             ALBUMIN (test code = ALB) 4.0 g/dL     3.4-5.0      N             

 

             GLOBULIN (test code = GLOB) 6.4 gram/dL  2.7-4.2      H            

 

 

             ALBUMIN/GLOBULIN RATIO (test code = A/G) 0.6          0.75-1.50    

L             

 

             CALCIUM (test code = CA) 9.5 mg/dL    8.5-10.1     N             

 

             BILIRUBIN TOTAL (test code = BILT) 1.20 mg/dL   0.0-1.0      H     

        

 

             SGOT/AST (test code = AST) 121 IUnit/L  15-37        H             

 

             SGPT/ALT (test code = ALT) 78 IUnit/L   12-78        N             

 

             ALKALINE PHOSPHATASE TOTAL (test code = ALKP) 132 IUnit/L    

     H            **Note change

in reference range due to change in reagent.**





CBC W/AUTO [id number] 09:00:00* 



             Test Item    Value        Reference Range Interpretation Comments

 

             WHITE BLOOD CELL (test code = WBC) 6.2 K/mm3    4.5-12.5     N     

        

 

             RED BLOOD CELL (test code = RBC) 4.12 mill/mm3 3.7-5.2      N      

       

 

             HEMOGLOBIN (test code = HGB) 13.5 gram/dL 11.5-15.5    N           

  

 

             HEMATOCRIT (test code = HCT) 40.6 %       36.0-46.0    N           

  

 

             MEAN CELL VOLUME (test code = MCV) 97.3 fL      80-98        N     

        

 

             MEAN CELL HGB (test code = MCH) 32.3 picogram 27.0-33.0    N       

      

 

             MEAN CELL HGB CONCETRATION (test code = MCHC) 33.2 gram/dL 33.0-36.

0    N             

 

             RED CELL DISTRIBUTION WIDTH (test code = RDW) 14.3 %       11.6-16.

2    N             

 

             RED CELL DISTRIBUTION WIDTH SD (test code = RDW-SD) 51.7 fL      37

.0-51.0    H             

 

             PLATELET COUNT (test code = PLT) 93 K/mm3     150-450      L       

      

 

             MEAN PLATELET VOLUME (test code = MPV) 10.3 fL      6.7-11.0     N 

            

 

             NEUTROPHIL % (test code = NT%) 70.6 %       39.0-69.0    H         

    

 

             IMMATURE GRANULOCYTE % (test code = IG%) 0.3 %        0.0-5.0      

N             

 

             LYMPHOCYTE % (test code = LY%) 16.0 %       25.0-55.0    L         

    

 

             MONOCYTE % (test code = MO%) 12.9 %       0.0-10.0     H           

  

 

             EOSINOPHIL % (test code = EO%) 0.0 %        0.0-5.0      N         

    

 

             BASOPHIL % (test code = BA%) 0.2 %        0.0-1.0      N           

  

 

             NUCLEATED RBC % (test code = NRBC%) 0.0 %        0-0          N    

         

 

             NEUTROPHIL # (test code = NT#) 4.36 K/mm3   1.8-7.7      N         

    

 

             IMMATURE GRANULOCYTE # (test code = IG#) 0.02 x10 3/uL 0-0.03      

 N             

 

             LYMPHOCYTE # (test code = LY#) 0.99 K/mm3   1.0-5.0      L         

    

 

             MONOCYTE # (test code = MO#) 0.80 K/mm3   0-0.8        N           

  

 

             EOSINOPHIL # (test code = EO#) 0.00 K/mm3   0.0-0.5      N         

    

 

             BASOPHIL # (test code = BA#) 0.01 K/mm3   0.0-0.2      N           

  

 

             NUCLEATED RBC # (test code = NRBC#) 0.00 K/mm3   0.0-0.1      N    

         

 

             MANUAL DIFF REQUIRED (test code = MDIFF) NO, ONLY SCAN NEEDED      

                      





DIFFERENTIAL [id number] 09:00:00* 



             Test Item    Value        Reference Range Interpretation Comments

 

             STAIN ACCEPTABILITY (test code = STN ACCEPTABLE)                   

                      

 

             CABOT RINGS (test code = CAB)                                      

   

 

             MORPHOLOGY COMMENT (test code = MOC)                               

          

 

             PLATELET ESTIMATE (test code = PLTEST)                             

            

 

             PLATELET MORPHOLOGY (test code = PLTMORPH)                         

                





CBC W/AUTO [id number] 09:00:00* 



             Test Item    Value        Reference Range Interpretation Comments

 

             WHITE BLOOD CELL (test code = WBC) 6.2 K/mm3    4.5-12.5     N     

        

 

             RED BLOOD CELL (test code = RBC) 4.12 mill/mm3 3.7-5.2      N      

       

 

             HEMOGLOBIN (test code = HGB) 13.5 gram/dL 11.5-15.5    N           

  

 

             HEMATOCRIT (test code = HCT) 40.6 %       36.0-46.0    N           

  

 

             MEAN CELL VOLUME (test code = MCV) 97.3 fL      80-98        N     

        

 

             MEAN CELL HGB (test code = MCH) 32.3 picogram 27.0-33.0    N       

      

 

             MEAN CELL HGB CONCETRATION (test code = MCHC) 33.2 gram/dL 33.0-36.

0    N             

 

             RED CELL DISTRIBUTION WIDTH (test code = RDW) 14.3 %       11.6-16.

2    N             

 

             RED CELL DISTRIBUTION WIDTH SD (test code = RDW-SD) 51.7 fL      37

.0-51.0    H             

 

             PLATELET COUNT (test code = PLT) 93 K/mm3     150-450      L       

      

 

             MEAN PLATELET VOLUME (test code = MPV) 10.3 fL      6.7-11.0     N 

            

 

             NEUTROPHIL % (test code = NT%) 70.6 %       39.0-69.0    H         

    

 

             IMMATURE GRANULOCYTE % (test code = IG%) 0.3 %        0.0-5.0      

N             

 

             LYMPHOCYTE % (test code = LY%) 16.0 %       25.0-55.0    L         

    

 

             MONOCYTE % (test code = MO%) 12.9 %       0.0-10.0     H           

  

 

             EOSINOPHIL % (test code = EO%) 0.0 %        0.0-5.0      N         

    

 

             BASOPHIL % (test code = BA%) 0.2 %        0.0-1.0      N           

  

 

             NUCLEATED RBC % (test code = NRBC%) 0.0 %        0-0          N    

         

 

             NEUTROPHIL # (test code = NT#) 4.36 K/mm3   1.8-7.7      N         

    

 

             IMMATURE GRANULOCYTE # (test code = IG#) 0.02 x10 3/uL 0-0.03      

 N             

 

             LYMPHOCYTE # (test code = LY#) 0.99 K/mm3   1.0-5.0      L         

    

 

             MONOCYTE # (test code = MO#) 0.80 K/mm3   0-0.8        N           

  

 

             EOSINOPHIL # (test code = EO#) 0.00 K/mm3   0.0-0.5      N         

    

 

             BASOPHIL # (test code = BA#) 0.01 K/mm3   0.0-0.2      N           

  

 

             NUCLEATED RBC # (test code = NRBC#) 0.00 K/mm3   0.0-0.1      N    

         

 

             MANUAL DIFF REQUIRED (test code = MDIFF) NO, ONLY SCAN NEEDED      

                      





DIFFERENTIAL [id number] 09:00:00* 



             Test Item    Value        Reference Range Interpretation Comments

 

             STAIN ACCEPTABILITY (test code = STN ACCEPTABLE)                   

                      

 

             CABOT RINGS (test code = CAB)                                      

   

 

             MORPHOLOGY COMMENT (test code = MOC)                               

          

 

             PLATELET ESTIMATE (test code = PLTEST)                             

            

 

             PLATELET MORPHOLOGY (test code = PLTMORPH)                         

                





CBC W/AUTO [id number] 09:00:00* 



             Test Item    Value        Reference Range Interpretation Comments

 

             WHITE BLOOD CELL (test code = WBC) 6.2 K/mm3    4.5-12.5     N     

        

 

             RED BLOOD CELL (test code = RBC) 4.12 mill/mm3 3.7-5.2      N      

       

 

             HEMOGLOBIN (test code = HGB) 13.5 gram/dL 11.5-15.5    N           

  

 

             HEMATOCRIT (test code = HCT) 40.6 %       36.0-46.0    N           

  

 

             MEAN CELL VOLUME (test code = MCV) 97.3 fL      80-98        N     

        

 

             MEAN CELL HGB (test code = MCH) 32.3 picogram 27.0-33.0    N       

      

 

             MEAN CELL HGB CONCETRATION (test code = MCHC) 33.2 gram/dL 33.0-36.

0    N             

 

             RED CELL DISTRIBUTION WIDTH (test code = RDW) 14.3 %       11.6-16.

2    N             

 

             RED CELL DISTRIBUTION WIDTH SD (test code = RDW-SD) 51.7 fL      37

.0-51.0    H             

 

             PLATELET COUNT (test code = PLT) 93 K/mm3     150-450      L       

      

 

             MEAN PLATELET VOLUME (test code = MPV) 10.3 fL      6.7-11.0     N 

            

 

             NEUTROPHIL % (test code = NT%) 70.6 %       39.0-69.0    H         

    

 

             IMMATURE GRANULOCYTE % (test code = IG%) 0.3 %        0.0-5.0      

N             

 

             LYMPHOCYTE % (test code = LY%) 16.0 %       25.0-55.0    L         

    

 

             MONOCYTE % (test code = MO%) 12.9 %       0.0-10.0     H           

  

 

             EOSINOPHIL % (test code = EO%) 0.0 %        0.0-5.0      N         

    

 

             BASOPHIL % (test code = BA%) 0.2 %        0.0-1.0      N           

  

 

             NUCLEATED RBC % (test code = NRBC%) 0.0 %        0-0          N    

         

 

             NEUTROPHIL # (test code = NT#) 4.36 K/mm3   1.8-7.7      N         

    

 

             IMMATURE GRANULOCYTE # (test code = IG#) 0.02 x10 3/uL 0-0.03      

 N             

 

             LYMPHOCYTE # (test code = LY#) 0.99 K/mm3   1.0-5.0      L         

    

 

             MONOCYTE # (test code = MO#) 0.80 K/mm3   0-0.8        N           

  

 

             EOSINOPHIL # (test code = EO#) 0.00 K/mm3   0.0-0.5      N         

    

 

             BASOPHIL # (test code = BA#) 0.01 K/mm3   0.0-0.2      N           

  

 

             NUCLEATED RBC # (test code = NRBC#) 0.00 K/mm3   0.0-0.1      N    

         

 

             MANUAL DIFF REQUIRED (test code = MDIFF) NO, ONLY SCAN NEEDED      

                      





DIFFERENTIAL [id number] 09:00:00* 



             Test Item    Value        Reference Range Interpretation Comments

 

             STAIN ACCEPTABILITY (test code = STN ACCEPTABLE)                   

                      

 

             MORPHOLOGY COMMENT (test code = MOC)                               

          

 

             PLATELET ESTIMATE (test code = PLTEST)                             

            

 

             PLATELET MORPHOLOGY (test code = PLTMORPH)                         

                





CBC W/AUTO [id number] 09:00:00* 



             Test Item    Value        Reference Range Interpretation Comments

 

             WHITE BLOOD CELL (test code = WBC) 6.2 K/mm3    4.5-12.5     N     

        

 

             RED BLOOD CELL (test code = RBC) 4.12 mill/mm3 3.7-5.2      N      

       

 

             HEMOGLOBIN (test code = HGB) 13.5 gram/dL 11.5-15.5    N           

  

 

             HEMATOCRIT (test code = HCT) 40.6 %       36.0-46.0    N           

  

 

             MEAN CELL VOLUME (test code = MCV) 97.3 fL      80-98        N     

        

 

             MEAN CELL HGB (test code = MCH) 32.3 picogram 27.0-33.0    N       

      

 

             MEAN CELL HGB CONCETRATION (test code = MCHC) 33.2 gram/dL 33.0-36.

0    N             

 

             RED CELL DISTRIBUTION WIDTH (test code = RDW) 14.3 %       11.6-16.

2    N             

 

             RED CELL DISTRIBUTION WIDTH SD (test code = RDW-SD) 51.7 fL      37

.0-51.0    H             

 

             PLATELET COUNT (test code = PLT) 93 K/mm3     150-450      L       

      

 

             MEAN PLATELET VOLUME (test code = MPV) 10.3 fL      6.7-11.0     N 

            

 

             NEUTROPHIL % (test code = NT%) 70.6 %       39.0-69.0    H         

    

 

             IMMATURE GRANULOCYTE % (test code = IG%) 0.3 %        0.0-5.0      

N             

 

             LYMPHOCYTE % (test code = LY%) 16.0 %       25.0-55.0    L         

    

 

             MONOCYTE % (test code = MO%) 12.9 %       0.0-10.0     H           

  

 

             EOSINOPHIL % (test code = EO%) 0.0 %        0.0-5.0      N         

    

 

             BASOPHIL % (test code = BA%) 0.2 %        0.0-1.0      N           

  

 

             NUCLEATED RBC % (test code = NRBC%) 0.0 %        0-0          N    

         

 

             NEUTROPHIL # (test code = NT#) 4.36 K/mm3   1.8-7.7      N         

    

 

             IMMATURE GRANULOCYTE # (test code = IG#) 0.02 x10 3/uL 0-0.03      

 N             

 

             LYMPHOCYTE # (test code = LY#) 0.99 K/mm3   1.0-5.0      L         

    

 

             MONOCYTE # (test code = MO#) 0.80 K/mm3   0-0.8        N           

  

 

             EOSINOPHIL # (test code = EO#) 0.00 K/mm3   0.0-0.5      N         

    

 

             BASOPHIL # (test code = BA#) 0.01 K/mm3   0.0-0.2      N           

  

 

             NUCLEATED RBC # (test code = NRBC#) 0.00 K/mm3   0.0-0.1      N    

         

 

             MANUAL DIFF REQUIRED (test code = MDIFF) NO, ONLY SCAN NEEDED      

                      





DIFFERENTIAL [id number] 09:00:00* 



             Test Item    Value        Reference Range Interpretation Comments

 

             STAIN ACCEPTABILITY (test code = STN ACCEPTABLE)                   

                      

 

             CABOT RINGS (test code = CAB)                                      

   

 

             MORPHOLOGY COMMENT (test code = MOC)                               

          

 

             PLATELET ESTIMATE (test code = PLTEST)                             

            

 

             PLATELET MORPHOLOGY (test code = PLTMORPH)                         

                





CBC W/AUTO [id number] 08:54:00* 



             Test Item    Value        Reference Range Interpretation Comments

 

             WHITE BLOOD CELL (test code = WBC)  K/mm3       4.5-12.5           

        

 

             RED BLOOD CELL (test code = RBC)  mill/mm3    3.7-5.2              

      

 

             HEMOGLOBIN (test code = HGB) 13.5 gram/dL 11.5-15.5    N           

  

 

             HEMATOCRIT (test code = HCT) 40.6 %       36.0-46.0    N           

  

 

             MEAN CELL VOLUME (test code = MCV)  fL          80-98              

        

 

             MEAN CELL HGB (test code = MCH)  picogram    27.0-33.0             

     

 

             MEAN CELL HGB CONCETRATION (test code = MCHC)  gram/dL     33.0-36.

0                  

 

             RED CELL DISTRIBUTION WIDTH (test code = RDW)  %           11.6-16.

2                  

 

             RED CELL DISTRIBUTION WIDTH SD (test code = RDW-SD)  fL          37

.0-51.0                  

 

             PLATELET COUNT (test code = PLT)  K/mm3       150-450              

      

 

             MEAN PLATELET VOLUME (test code = MPV)  fL          6.7-11.0       

            

 

             NEUTROPHIL % (test code = NT%)  %           39.0-69.0              

    

 

             IMMATURE GRANULOCYTE % (test code = IG%)  %           0.0-5.0      

              

 

             LYMPHOCYTE % (test code = LY%)  %           25.0-55.0              

    

 

             MONOCYTE % (test code = MO%)  %           0.0-10.0                 

  

 

             EOSINOPHIL % (test code = EO%)  %           0.0-5.0                

    

 

             BASOPHIL % (test code = BA%)  %           0.0-1.0                  

  

 

             NEUTROPHIL # (test code = NT#)  K/mm3       1.8-7.7                

    

 

             LYMPHOCYTE # (test code = LY#)  K/mm3       1.0-5.0                

    

 

             MONOCYTE # (test code = MO#)  K/mm3       0-0.8                    

  

 

             EOSINOPHIL # (test code = EO#)  K/mm3       0.0-0.5                

    

 

             BASOPHIL # (test code = BA#)  K/mm3       0.0-0.2                  

  





[id number] 02:16:00* 



             Test Item    Value        Reference Range Interpretation Comments

 

             TROPONIN-I (test code = TROPI) <0.015 ng/mL 0-0.045      N         

    





COMMENTS TO PHLEBOTOMIST: COLLECT 3 HOURS AFTER PREVIOUS                        
  [id number] 02:03:00* 



             Test Item    Value        Reference Range Interpretation Comments

 

             GLYCOSYLATED HEMOGLOBIN (HA1C) (test code = GLYHGB) 4.9 % HbA1     

                        SUGGESTED 

DIAGNOSIS:    HbA1C (%)----------------------  -----------Diabetic              
 >6.4Prediabetes              5.7 - 6.4Normal                  <5.7

 

             ESTIMATED AVERAGE GLUCOSE (test code = EAG) 94 MG/DL               

                 





2218-  ABDOMEN [id number] 21:39:00  Name: REYES,JOSEPHINE                  
[facility]                     : 1955 Age/S: 64  / F         [address] 
[facility]                Unit #: [id number]     Loc:               [place]  [address]              Phys: Lucius Manzo MD                              
                Acct: [id number]  Dis Date:               Status: ADM IN      
                           PHONE #: [phone]     Exam Date: 2020
                    FAX #: [phone]      Reason: possible gallstones        
                        EXAMS:                                               CPT
CODE:      [id number]  ABDOMEN LTD                             [id number]           
                REASON FOR EXAM: possible gallstones               EXAM ORDER 
DATE: 2020 8:52 PM               Ordering: Lucius Manzo MD       
Attending:Itz Sam MD       Location:[facility]               PROCEDURE:  - US 
ABDOMEN LTD               FINDINGS: The liver is unremarkable. There is no 
evidence of focal       mass identified. The pancreas is within normal limits.  
             The right kidney measures 9.2 x 3.9 cm. There is no evidence of    
  hydronephrosis. There is no evidence of nephrolithiasis. There is no       
evidence of renal mass.               The gallbladder is unremarkable without 
evidence of gall stone.  No       evidence of gallbladder wall thickening or 
pericholecystic fluid.  The       common bile duct measures 0.3 cm.             
  There is no evidence of ascites. The aorta and IVC are within normal       
limits. The portal vein is patent with hepatopetal flow                 
IMPRESSION: No evidence of gallstone          ** Electronically Signed by DANNA Lorenzo on 2020 at  **                      Reported and signed by: 
Billy Lorenzo M.D.              CC: Lucius Manzo MD                            
                                                                                
     Technologist: Makenna Amanda RDMS                              Trnscb
Date/Time: 2020 () t.SDR.VTL                        Orig Print D/T: S:
2020 ()     Probe:                       PAGE  1                      
Signed Report                               B-TYPE NATRIURETIC [id number]
21:02:00* 



             Test Item    Value        Reference Range Interpretation Comments

 

             B-TYPE NATRIURETIC PEPTIDE (test code = BNP) 226.98 pgram/mL 0-100 

       H             





- CT ABD PELVIS W/O [id number] 20:45:00  Name: REYES,JOSEPHINE              
    [facility]                     : 1955 Age/S: 64  / F         
[address] [facility]                Unit #: [id number]     Loc:               
[place]  [address]              Phys: Lucius Manzo MD                   
                           Acct: [id number]  Dis Date:               Status: 
REG ER                                  PHONE #: [phone]     Exam Date: 
2020                     FAX #: [phone]      Reason: pain, 
vomiting                                      EXAMS:                            
                  CPT CODE:      [id number] CT ABD PELVIS W/O CONT               
     [id number]                            REASON FOR EXAM: pain, vomiting           
   EXAM ORDER DATE: 2020 7:18 PM               Ordering: Lucius Manzo MD       Attending:Lucius Manzo MD       Location:[facility]               
PROCEDURE:  - CT ABD PELVIS W/O CONT               COMPARISON:               
FINDINGS: CT images of the abdomen and pelvis were obtained without IV       and
without oral contrast at 5mm. Dose modulation, iterative       reconstruction, 
and/or weight based adjustment of the MA/KV was       utilized to reduce the 
radiation dose to as low as reasonably       achievable.                The 
liver,  spleen,  pancreas  are grossly within normal limits.        The 
gallbladder is minimally distended with probable gallstones.               The 
kidneys are within normal limits.  The urinary bladder is       unremarkable.   
           The colon, small bowel, and stomach are within normal limits without 
     evidence of obstruction.  The appendix is unremarkable               No 
evidence of free air or free fluid. The uterus is unremarkable.                 
IMPRESSION: Small hiatal hernia.  Probable cholelithiasis.          ** Electron
ically Signed by DANNA Lorenzo on 2020 at  **                      Repo
rted and signed by: Billy Lorenzo M.D.        CC: Lucius Manzo MD              
                                                                                
                   Technologist:JAIMEE DAS, RT(R)      CT    CTDI:        D
LP:        Trnscb Date/Time: 2020 () t.SDR.VTL                        
Orig Print D/T: S: 2020 ()      PAGE  1                       Signed [place]                               - CT HEAD/BRAIN W/O [id number] 20:44:00 
Name: REYES,JOSEPHINE                   [facility]                     : 
1955 Age/S: 64  / F         [address] [facility]                Unit #: [id number]
[id number]     Loc:               [place]  [address]              Phys: Lucius Manzo MD                                               Acct: [id number]  Di
s Date:               Status: REG ER                                  PHONE #: [phone]
[phone]     Exam Date: 2020                     FAX #: [phone]
[phone]      Reason: pain, vomiting, dizziness                           EXAMS:     
                                         CPT CODE:      [id number] CT HEAD/BRAIN 
W/O CONT                     [id number]                            REASON FOR EXAM: 
pain, vomiting, dizziness               EXAM ORDER DATE: 2020 7:18 PM      
        Ordering: Lucius Manzo MD       Attending:Lucius Manzo MD   
   Location:[facility]               PROCEDURE:  - CT HEAD/BRAIN W/O CONT              
COMPARISON: 3/15/2019               FINDINGS: CT images of the brain were obt
ained without IV contrast.        Dose modulation, iterative reconstruction, and
/or weight based       adjustment of the MA/KV was utilized to reduce the radiat
ion dose to       as low as reasonably achievable.                Mild patchy lo
w densities appearance of the paraventricular region       noted consistent with
nonspecific white matter disease. The gray-white       matter delineation is un
remarkable. The ventricles, cisterns, and       sulci are minimally prominent. T
here is no evidence of hemorrhage,       mass, mass effect.  There is no evidenc
e of acute or old  infarct. The       calvarium is intact.                 IMPRE
SSION: Stable appearance of the nonspecific deep white matter         disease an
d minimal advancement generalized atrophy.  No acute         findings           
        ** Electronically Signed by DANNA Lorenzo on 2020 at  **       
              Reported and signed by: Billy Lorenzo M.D.          CC: Lucius Manzo MD                                                                           
                                       Technologist:JAIMEE DAS, RT(R)      CT
   CTDI:        DLP:        Trnscb Date/Time: 2020 () t.SDR.VTL       
                Orig Print D/T: S: 2020 ()      PAGE  1               
       Signed Report                               URINALYSIS [id number]
20:36:00* 



             Test Item    Value        Reference Range Interpretation Comments

 

             UA COLOR (test code = COLU) YELLOW       YELLOW                    

 

 

             UA APPEARANCE (test code = APPU) CLEAR        CLEAR                

      

 

             UA GLUCOSE DIPSTICK (test code = DGLUU)  (1+) mg/dL NEGATIVE 

    A             

 

             UA BILIRUBIN DIPSTICK (test code = BILU) NEGATIVE mg/dL NEGATIVE   

                

 

             UA KETONE DIPSTICK (test code = KETU) 10 (1+) mg/dL NEGATIVE     A 

            

 

             UA SPECIFIC GRAVITY (test code = SGU) 1.015        1.001-1.035     

           

 

             UA BLOOD DIPSTICK (test code = CLARE) 0.03 mg/dL (Trace) mg/dL NEGATI

VE     A             

 

             UA PH DIPSTICK (test code = SHAWN) 8.0          5.0-8.0              

      

 

             UA PROTEIN DIPSTICK (test code = PROU) 100 (2+) mg/dL NEGATIVE     

              

 

             UA UROBILINIOGEN DIPSTICK (test code = URO) 0.0-0.2 (NORMAL) mg/dL 

NEGATIVE                   

 

             UA NITRITE DIPSTICK (test code = PRISCA) NEGATIVE     NEGATIVE       

            

 

             UA LEUKOCYTE ESTERASE W REFLEX (test code = LEUUR) NEGATIVE Tree/uL 

NEGATIVE                   

 

             UA WBC (test code = WBCU) 0-5 per HPF  0-5                        

 

             UA RBC (test code = RBCU) 0-2 #/HPF    0-5                        

 

             UA EPITHELIAL CELLS (test code = EPIU) FEW per HPF  FEW            

            

 

             UA BACTERIA (test code = BACU) FEW #/HPF    NONE         A         

    

 

             UA HYALINE CAST (test code = HYALU) 0-2 #/LPF    0-5               

         

 

             UA MUCUS (test code = MUCU) FEW #/LPF    FEW                       

 





Urine Source? Clean CatchDRUGS OF ABUSE SCREEN [id number] 20:36:00* 



             Test Item    Value        Reference Range Interpretation Comments

 

             URN COCAINE (test code = COCAURN) NEGATIVE     <300 ng/mL          

       

 

             URN CANNABINOIDS (test code = CANNABURN) POSITIVE     <50 ng/mL    

A            This test provides

only a preliminary test result.  A morespecific alternate chemical method must 
be used in order toobtain a confirmed analytical result.  Gas 
chromatography/mass spectrometry (GC/MS) is thepreferred confirmatory method.  
Other chemical confirmationmethods are available.  Clinical consideration and 
professional judgment should be applied to any drug of abusetest result, 
particularly when preliminary positive resultsare used.Unconfirmed screening 
results must not be used fornon-medical purposes (e.g., employment testing, 
legaltesting).

 

             URN AMPHETAMINE (test code = AMPHETURN) NEGATIVE     <1000 ng/mL   

             

 

             URN BARBITURATE (test code = BARBITURN) NEGATIVE     <200 ng/mL    

             

 

             URN BENZODIAZEPINE (test code = BENZOURN) NEGATIVE     <200 ng/mL  

               

 

             URN OPIATES (test code = OPIATURN) NEGATIVE     <300 ng/mL         

        

 

             URN PHENCYCLIDINE (PCP) (test code = PHENCURN) NEGATIVE     <25 ng/

mL                  

 

             URN METHADONE (test code = METHAURN) NEGATIVE     <300 ng/mL       

          





Urine Source? Clean CatchPROTHROMBIN [id number] 20:23:00* 



             Test Item    Value        Reference Range Interpretation Comments

 

             PROTHROMBIN TIME PATIENT (test code = PTP) 11.1 seconds 9.0-14.0   

  N             

 

             INTERNATIONAL NORMAL RATIO (test code = INR) 0.9          0.8-1.2  

    N            The therapeutic range

for oral anticoagulant therapy formost indications is an international 
normalized ratio (INR)of between 2.0 and 3.0.  The recommended therapeutic 
INRrange for various clinical situations is listed 
below:_________________________________________________________Clinical 
Situation                          INR 
range_________________________________________________________ Pulmonary e
mbolism treatment              (2.0-3.0)Venous thrombosis treatmentVenous 
thrombosis prophylaxis (high risk surgery)Prevention of systemic embolism from: 
       Acute myocardial infarction         Valvular heart disease         Atrial
fibrillation Mechanical prosthetic heart valves          (2.5-3.5)





IS PATIENT ON ANTICOAGULANTS? NTHROMBOPLASTIN TIME [id number] 20:23:00* 



             Test Item    Value        Reference Range Interpretation Comments

 

             THROMBOPLASTIN TIME PARTIAL (test code = PTT) 26.3 seconds 23.0-37.

0    N             





IS PATIENT ON ANTICOAGULANTS? NBASIC METABOLIC [id number] 20:20:00* 



             Test Item    Value        Reference Range Interpretation Comments

 

             SODIUM (test code = NA) 136 mmol/L   136-145      N             

 

             POTASSIUM (test code = K) 3.5 mmol/L   3.5-5.1      N             

 

             CHLORIDE (test code = CL) 100.0 mmol/L        N             

 

             CARBON DIOXIDE (test code = CO2) 22.0 mmol/L  21-32        N       

      

 

             ANION GAP (test code = GAP) 17.5         10-20        N            

 

 

             GLUCOSE (test code = GLU) 202 mg/dL           H             

 

             BLOOD UREA NITROGEN (test code = BUN) 3 mg/dL      7-18         L  

           

 

             GLOMERULAR FILTRATION RATE (test code = GFR) > 60 mL/min  >=60     

                 Estimated GFR by

using Modified MDRD formula.Chronic kidney disease is defined as either kidney 
damageor GFR <60 mL/min/1.73 m2 for >3 months.

 

             CREATININE (test code = CREAT) 0.80 mg/dL   0.55-1.02    N         

   **Note change in reference

range due to change in reagent.**

 

             BUN/CREATININE RATIO (test code = BUN/CREA) 3.9          10-20     

   L             

 

             CALCIUM (test code = CA) 9.5 mg/dL    8.5-10.1     N             





HEPATIC FUNCTION [id number] 20:20:00* 



             Test Item    Value        Reference Range Interpretation Comments

 

             TOTAL PROTEIN (test code = PROT) 11.0 gram/dL 6.4-8.2      H       

      

 

             ALBUMIN (test code = ALB) 4.4 g/dL     3.4-5.0      N             

 

             GLOBULIN (test code = GLOB) 6.6 gram/dL  2.7-4.2      H            

 

 

             ALBUMIN/GLOBULIN RATIO (test code = A/G) 0.7          0.75-1.50    

L             

 

             BILIRUBIN TOTAL (test code = BILT) 1.00 mg/dL   0.0-1.0      N     

        

 

             BILIRUBIN DIRECT (test code = BILD) 0.23 mg/dL   0.0-0.20     H    

         

 

             SGOT/AST (test code = AST) 198 IUnit/L  15-37        H             

 

             SGPT/ALT (test code = ALT) 101 IUnit/L  12-78        H             

 

             ALKALINE PHOSPHATASE TOTAL (test code = ALKP) 152 IUnit/L    

     H            **Note change

in reference range due to change in reagent.**





[id number] 20:20:00* 



             Test Item    Value        Reference Range Interpretation Comments

 

             LIPASE (test code = LIP) 118 U/L      73.0-393.0   N             





[id number] 20:20:00* 



             Test Item    Value        Reference Range Interpretation Comments

 

             TROPONIN-I (test code = TROPI) <0.015 ng/mL 0-0.045      N         

    





URINALYSIS [id number] 20:18:00* 



             Test Item    Value        Reference Range Interpretation Comments

 

             UA COLOR (test code = COLU) YELLOW       YELLOW                    

 

 

             UA APPEARANCE (test code = APPU) CLEAR        CLEAR                

      

 

             UA GLUCOSE DIPSTICK (test code = DGLUU)  (1+) mg/dL NEGATIVE 

    A             

 

             UA BILIRUBIN DIPSTICK (test code = BILU) NEGATIVE mg/dL NEGATIVE   

                

 

             UA KETONE DIPSTICK (test code = KETU) 10 (1+) mg/dL NEGATIVE     A 

            

 

             UA SPECIFIC GRAVITY (test code = SGU) 1.015        1.001-1.035     

           

 

             UA BLOOD DIPSTICK (test code = CLARE) 0.03 mg/dL (Trace) mg/dL NEGATI

VE     A             

 

             UA PH DIPSTICK (test code = SHAWN) 8.0          5.0-8.0              

      

 

             UA PROTEIN DIPSTICK (test code = PROU) 100 (2+) mg/dL NEGATIVE     

              

 

             UA UROBILINIOGEN DIPSTICK (test code = URO) 0.0-0.2 (NORMAL) mg/dL 

NEGATIVE                   

 

             UA NITRITE DIPSTICK (test code = PRISCA) NEGATIVE     NEGATIVE       

            

 

             UA LEUKOCYTE ESTERASE W REFLEX (test code = LEUUR) NEGATIVE Tree/uL 

NEGATIVE                   

 

             UA WBC (test code = WBCU) 0-5 per HPF  0-5                        

 

             UA RBC (test code = RBCU) 0-2 #/HPF    0-5                        

 

             UA EPITHELIAL CELLS (test code = EPIU) FEW per HPF  FEW            

            

 

             UA BACTERIA (test code = BACU) FEW #/HPF    NONE         A         

    

 

             UA HYALINE CAST (test code = HYALU) 0-2 #/LPF    0-5               

         

 

             UA MUCUS (test code = MUCU) FEW #/LPF    FEW                       

 





Urine Source? Clean CatchDRUGS OF ABUSE SCREEN [id number] 20:18:00* 



             Test Item    Value        Reference Range Interpretation Comments

 

             URN COCAINE (test code = COCAURN)              <300 ng/mL          

       

 

             URN CANNABINOIDS (test code = CANNABURN)              <50 ng/mL    

              

 

             URN AMPHETAMINE (test code = AMPHETURN)              <1000 ng/mL   

             

 

             URN BARBITURATE (test code = BARBITURN)              <200 ng/mL    

             

 

             URN BENZODIAZEPINE (test code = BENZOURN)              <200 ng/mL  

               

 

             URN OPIATES (test code = OPIATURN)              <300 ng/mL         

        

 

             URN PHENCYCLIDINE (PCP) (test code = PHENCURN)              <25 ng/

mL                  

 

             URN METHADONE (test code = METHAURN)              <300 ng/mL       

          





Urine Source? Clean CatchCBC W/O [id number] 20:06:00* 



             Test Item    Value        Reference Range Interpretation Comments

 

             WHITE BLOOD CELL (test code = WBC) 6.4 K/mm3    4.5-12.5     N     

        

 

             RED BLOOD CELL (test code = RBC) 4.30 mill/mm3 3.7-5.2      N      

       

 

             HEMOGLOBIN (test code = HGB) 13.9 gram/dL 11.5-15.5    N           

  

 

             HEMATOCRIT (test code = HCT) 41.3 %       36.0-46.0    N           

  

 

             MEAN CELL VOLUME (test code = MCV) 96.0 fL      80-98        N     

        

 

             MEAN CELL HGB (test code = MCH) 32.3 picogram 27.0-33.0    N       

      

 

             MEAN CELL HGB CONCETRATION (test code = MCHC) 33.7 gram/dL 33.0-36.

0    N             

 

             RED CELL DISTRIBUTION WIDTH (test code = RDW) 14.2 %       11.6-16.

2    N             

 

             PLATELET COUNT (test code = PLT) 98 K/mm3     150-450      L       

      

 

             MEAN PLATELET VOLUME (test code = MPV) 10.6 fL      6.7-11.0     N 

            





BASIC METABOLIC [id number] 20:06:00* 



             Test Item    Value        Reference Range Interpretation Comments

 

             SODIUM (test code = NA) 136 mmol/L   136-145      N             

 

             POTASSIUM (test code = K) 3.5 mmol/L   3.5-5.1      N             

 

             CHLORIDE (test code = CL) 100.0 mmol/L        N             

 

             CARBON DIOXIDE (test code = CO2)  mmol/L      21-32                

      

 

             ANION GAP (test code = GAP)              10-20                     

 

 

             GLUCOSE (test code = GLU)  mg/dL                            

 

             BLOOD UREA NITROGEN (test code = BUN)  mg/dL       7-18            

           

 

             GLOMERULAR FILTRATION RATE (test code = GFR)  mL/min      >=60     

                  

 

             CREATININE (test code = CREAT)  mg/dL       0.55-1.02              

    

 

             BUN/CREATININE RATIO (test code = BUN/CREA)              10-20     

                 

 

             CALCIUM (test code = CA)  mg/dL       8.5-10.1                   





HEPATIC FUNCTION [id number] 20:06:00* 



             Test Item    Value        Reference Range Interpretation Comments

 

             TOTAL PROTEIN (test code = PROT)  gram/dL     6.4-8.2              

      

 

             ALBUMIN (test code = ALB)  g/dL        3.4-5.0                    

 

             GLOBULIN (test code = GLOB)  gram/dL     2.7-4.2                   

 

 

             ALBUMIN/GLOBULIN RATIO (test code = A/G)              0.75-1.50    

              

 

             BILIRUBIN TOTAL (test code = BILT)  mg/dL       0.0-1.0            

        

 

             BILIRUBIN DIRECT (test code = BILD)  mg/dL       0.0-0.20          

         

 

             SGOT/AST (test code = AST)  IUnit/L     15-37                      

 

             SGPT/ALT (test code = ALT)  IUnit/L     12-78                      

 

             ALKALINE PHOSPHATASE TOTAL (test code = ALKP)  IUnit/L       

                   





[id number] 20:06:00* 



             Test Item    Value        Reference Range Interpretation Comments

 

             LIPASE (test code = LIP)  U/L         73.0-393.0                 





[id number] 20:06:00* 



             Test Item    Value        Reference Range Interpretation Comments

 

             TROPONIN-I (test code = TROPI)  ng/mL       0-0.045                

    





- XR CHEST 1 -38-05 20:04:00 FAX:         Lucius Manzo [phone]
   Tensed:    St: REG----------
---------------------------------------------------------------------  Name:   OVIDIO SALAZAR                  [facility]                     : [birth year]  Age/S: 64/F           [address] [facility]                Unit #: [id number]    
 Loc: [place]  [address]              Phys: Lucius Manzo MD
                                              Acct: [id number] Dis Date:      
        Status: REG ER                                 PHONE #: [phone]    
Exam Date:     2020                   FAX #: [phone]     
Reason: CHEST PAIN                                         EXAMS:               
                               CPT CODE:      [id number] XR CHEST 1 V            
                  [id number]                            REASON FOR EXAM: CHEST PAIN  
            EXAM ORDER DATE: 2020 7:19 PM               Ordering: Lucius Manzo MD       Attending:Lucius Manzo MD       Location:[facility]          
    PROCEDURE:  - XR CHEST 1 V               COMPARISON: 2020              
FINDINGS:  Portable AP frontal view of the chest obtained at 7:59 PM       shows
clear lungs without evidence of consolidation. There is no       evidence of 
effusion. The heart size is within normal limits.       Pulmonary vasculatures 
are unremarkable.                  IMPRESSION: Hyperinflated lungs          ** 
Electronically Signed by DANNA Lorenzo on 2020 at  **                  
   Reported and signed by: Billy Lorenzo M.D.                     CC: Lucius Manzo MD                                                                           
                                       Technologist: MALLIKA DE LEON RT 
(R); ...                     Trnscrd Date/Time/By: 2020 () : By: josseline CHANVTL           Orig Print D/T: S: 2020 ()                         P
AGE  1                       Signed Report                               CBC W/O
[id number] 19:58:00* 



             Test Item    Value        Reference Range Interpretation Comments

 

             WHITE BLOOD CELL (test code = WBC)  K/mm3       4.5-12.5           

        

 

             RED BLOOD CELL (test code = RBC)  mill/mm3    3.7-5.2              

      

 

             HEMOGLOBIN (test code = HGB) 13.9 gram/dL 11.5-15.5    N           

  

 

             HEMATOCRIT (test code = HCT) 41.3 %       36.0-46.0    N           

  

 

             MEAN CELL VOLUME (test code = MCV)  fL          80-98              

        

 

             MEAN CELL HGB (test code = MCH)  picogram    27.0-33.0             

     

 

             MEAN CELL HGB CONCETRATION (test code = MCHC)  gram/dL     33.0-36.

0                  

 

             RED CELL DISTRIBUTION WIDTH (test code = RDW)  %           11.6-16.

2                  

 

             PLATELET COUNT (test code = PLT)  K/mm3       150-450              

      

 

             MEAN PLATELET VOLUME (test code = MPV)  fL          6.7-11.0       

            





COMPREHENSIVE METABOLIC PANEL2020-04-10 05:03:00* 



             Test Item    Value        Reference Range Interpretation Comments

 

             SODIUM (test code = NA) 136 mmol/L   136-145      N             

 

             POTASSIUM (test code = K) 3.5 mmol/L   3.5-5.1      N             

 

             CHLORIDE (test code = CL) 107.0 mmol/L        N             

 

             CARBON DIOXIDE (test code = CO2) 21.0 mmol/L  21-32        N       

      

 

             ANION GAP (test code = GAP) 11.5         10-20        N            

 

 

             GLUCOSE (test code = GLU) 132 mg/dL           H             

 

             BLOOD UREA NITROGEN (test code = BUN) 6 mg/dL      7-18         L  

           

 

             GLOMERULAR FILTRATION RATE (test code = GFR) > 60 mL/min  >=60     

                 Estimated GFR by

using Modified MDRD formula.Chronic kidney disease is defined as either kidney 
damageor GFR <60 mL/min/1.73 m2 for >3 months.

 

             CREATININE (test code = CREAT) 0.80 mg/dL   0.55-1.02    N         

   **Note change in reference

range due to change in reagent.**

 

             BUN/CREATININE RATIO (test code = BUN/CREA) 7.5          10-20     

   L             

 

             TOTAL PROTEIN (test code = PROT) 7.4 gram/dL  6.4-8.2      N       

      

 

             ALBUMIN (test code = ALB) 2.8 g/dL     3.4-5.0      L             

 

             GLOBULIN (test code = GLOB) 4.6 gram/dL  2.7-4.2      H            

 

 

             ALBUMIN/GLOBULIN RATIO (test code = A/G) 0.6          0.75-1.50    

L             

 

             CALCIUM (test code = CA) 8.6 mg/dL    8.5-10.1     N             

 

             BILIRUBIN TOTAL (test code = BILT) 0.60 mg/dL   0.0-1.0      N     

        

 

             SGOT/AST (test code = AST) 41 IUnit/L   15-37        H             

 

             SGPT/ALT (test code = ALT) 38 IUnit/L   12-78        N             

 

             ALKALINE PHOSPHATASE TOTAL (test code = ALKP) 75 IUnit/L     

     N            **Note change 

in reference range due to change in reagent.**





COMPREHENSIVE METABOLIC PANEL2020-04-10 04:57:00* 



             Test Item    Value        Reference Range Interpretation Comments

 

             SODIUM (test code = NA) 136 mmol/L   136-145      N             

 

             POTASSIUM (test code = K) 3.5 mmol/L   3.5-5.1      N             

 

             CHLORIDE (test code = CL) 107.0 mmol/L        N             

 

             CARBON DIOXIDE (test code = CO2)  mmol/L      21-32                

      

 

             ANION GAP (test code = GAP)              10-20                     

 

 

             GLUCOSE (test code = GLU)  mg/dL                            

 

             BLOOD UREA NITROGEN (test code = BUN)  mg/dL       7-18            

           

 

             GLOMERULAR FILTRATION RATE (test code = GFR)  mL/min      >=60     

                  

 

             CREATININE (test code = CREAT)  mg/dL       0.55-1.02              

    

 

             BUN/CREATININE RATIO (test code = BUN/CREA)              10-20     

                 

 

             TOTAL PROTEIN (test code = PROT)  gram/dL     6.4-8.2              

      

 

             ALBUMIN (test code = ALB)  g/dL        3.4-5.0                    

 

             GLOBULIN (test code = GLOB)  gram/dL     2.7-4.2                   

 

 

             ALBUMIN/GLOBULIN RATIO (test code = A/G)              0.75-1.50    

              

 

             CALCIUM (test code = CA)  mg/dL       8.5-10.1                   

 

             BILIRUBIN TOTAL (test code = BILT)  mg/dL       0.0-1.0            

        

 

             SGOT/AST (test code = AST)  IUnit/L     15-37                      

 

             SGPT/ALT (test code = ALT)  IUnit/L     12-78                      

 

             ALKALINE PHOSPHATASE TOTAL (test code = ALKP)  IUnit/L       

                   





COMPREHENSIVE METABOLIC [id number] 05:39:00* 



             Test Item    Value        Reference Range Interpretation Comments

 

             SODIUM (test code = NA) 138 mmol/L   136-145      N             

 

             POTASSIUM (test code = K) 3.1 mmol/L   3.5-5.1      L             

 

             CHLORIDE (test code = CL) 106.0 mmol/L        N             

 

             CARBON DIOXIDE (test code = CO2) 29.0 mmol/L  21-32        N       

      

 

             ANION GAP (test code = GAP) 6.1          10-20        L            

 

 

             GLUCOSE (test code = GLU) 105 mg/dL           N             

 

             BLOOD UREA NITROGEN (test code = BUN) 5 mg/dL      7-18         L  

           

 

             GLOMERULAR FILTRATION RATE (test code = GFR) > 60 mL/min  >=60     

                 Estimated GFR by

using Modified MDRD formula.Chronic kidney disease is defined as either kidney 
damageor GFR <60 mL/min/1.73 m2 for >3 months.

 

             CREATININE (test code = CREAT) 0.60 mg/dL   0.55-1.02    N         

   **Note change in reference

range due to change in reagent.**

 

             BUN/CREATININE RATIO (test code = BUN/CREA) 8.3          10-20     

   L             

 

             TOTAL PROTEIN (test code = PROT) 7.0 gram/dL  6.4-8.2      N       

      

 

             ALBUMIN (test code = ALB) 2.9 g/dL     3.4-5.0      L             

 

             GLOBULIN (test code = GLOB) 4.1 gram/dL  2.7-4.2      N            

 

 

             ALBUMIN/GLOBULIN RATIO (test code = A/G) 0.7          0.75-1.50    

L             

 

             CALCIUM (test code = CA) 8.4 mg/dL    8.5-10.1     L             

 

             BILIRUBIN TOTAL (test code = BILT) 0.90 mg/dL   0.0-1.0      N     

        

 

             SGOT/AST (test code = AST) 45 IUnit/L   15-37        H             

 

             SGPT/ALT (test code = ALT) 38 IUnit/L   12-78        N             

 

             ALKALINE PHOSPHATASE TOTAL (test code = ALKP) 79 IUnit/L     

     N            **Note change 

in reference range due to change in reagent.**





[id number] 05:39:00* 



             Test Item    Value        Reference Range Interpretation Comments

 

             MAGNESIUM (test code = MAG) 1.8 mg/dL    1.8-2.4      N            

 





CBC W/AUTO [id number] 05:27:00* 



             Test Item    Value        Reference Range Interpretation Comments

 

             WHITE BLOOD CELL (test code = WBC) 5.1 K/mm3    4.5-12.5     N     

        

 

             RED BLOOD CELL (test code = RBC) 3.47 mill/mm3 3.7-5.2      L      

       

 

             HEMOGLOBIN (test code = HGB) 11.4 gram/dL 11.5-15.5    L           

  

 

             HEMATOCRIT (test code = HCT) 34.7 %       36.0-46.0    L           

  

 

             MEAN CELL VOLUME (test code = MCV) 100.0 fL     80-98        H     

        

 

             MEAN CELL HGB (test code = MCH) 32.9 picogram 27.0-33.0    N       

      

 

             MEAN CELL HGB CONCETRATION (test code = MCHC) 32.9 gram/dL 33.0-36.

0    L             

 

             RED CELL DISTRIBUTION WIDTH (test code = RDW) 12.5 %       11.6-16.

2    N             

 

             RED CELL DISTRIBUTION WIDTH SD (test code = RDW-SD) 45.5 fL      37

.0-51.0    N             

 

             PLATELET COUNT (test code = PLT) 89 K/mm3     150-450      L       

      

 

             MEAN PLATELET VOLUME (test code = MPV) 11.3 fL      6.7-11.0     H 

            

 

             NEUTROPHIL % (test code = NT%) 42.7 %       39.0-69.0    N         

    

 

             IMMATURE GRANULOCYTE % (test code = IG%) 0.6 %        0.0-5.0      

N             

 

             LYMPHOCYTE % (test code = LY%) 36.6 %       25.0-55.0    N         

    

 

             MONOCYTE % (test code = MO%) 17.9 %       0.0-10.0     H           

  

 

             EOSINOPHIL % (test code = EO%) 1.6 %        0.0-5.0      N         

    

 

             BASOPHIL % (test code = BA%) 0.6 %        0.0-1.0      N           

  

 

             NUCLEATED RBC % (test code = NRBC%) 0.0 %        0-0          N    

         

 

             NEUTROPHIL # (test code = NT#) 2.17 K/mm3   1.8-7.7      N         

    

 

             IMMATURE GRANULOCYTE # (test code = IG#) 0.03 x10 3/uL 0-0.03      

 N             

 

             LYMPHOCYTE # (test code = LY#) 1.86 K/mm3   1.0-5.0      N         

    

 

             MONOCYTE # (test code = MO#) 0.91 K/mm3   0-0.8        H           

  

 

             EOSINOPHIL # (test code = EO#) 0.08 K/mm3   0.0-0.5      N         

    

 

             BASOPHIL # (test code = BA#) 0.03 K/mm3   0.0-0.2      N           

  

 

             NUCLEATED RBC # (test code = NRBC#) 0.00 K/mm3   0.0-0.1      N    

         

 

             MANUAL DIFF REQUIRED (test code = MDIFF) NO, ONLY SCAN NEEDED      

                      





DIFFERENTIAL [id number] 05:27:00* 



             Test Item    Value        Reference Range Interpretation Comments

 

             STAIN ACCEPTABILITY (test code = STN ACCEPTABLE) STAIN ACCEPTABLE  

                          

 

             ANISOCYTOSIS (test code = ANISO) 1+                                

      

 

             MORPHOLOGY COMMENT (test code = MOC) TEST NOT PERFORMED            

                

 

             PLATELET ESTIMATE (test code = PLTEST) DECREASED                   

            

 

             PLATELET MORPHOLOGY (test code = PLTMORPH) NORMAL                  

                





COMPREHENSIVE METABOLIC [id number] 05:20:00* 



             Test Item    Value        Reference Range Interpretation Comments

 

             SODIUM (test code = NA) 138 mmol/L   136-145      N             

 

             POTASSIUM (test code = K) 3.1 mmol/L   3.5-5.1      L             

 

             CHLORIDE (test code = CL) 106.0 mmol/L        N             

 

             CARBON DIOXIDE (test code = CO2)  mmol/L      21-32                

      

 

             ANION GAP (test code = GAP)              10-20                     

 

 

             GLUCOSE (test code = GLU)  mg/dL                            

 

             BLOOD UREA NITROGEN (test code = BUN)  mg/dL       7-18            

           

 

             GLOMERULAR FILTRATION RATE (test code = GFR)  mL/min      >=60     

                  

 

             CREATININE (test code = CREAT)  mg/dL       0.55-1.02              

    

 

             BUN/CREATININE RATIO (test code = BUN/CREA)              10-20     

                 

 

             TOTAL PROTEIN (test code = PROT)  gram/dL     6.4-8.2              

      

 

             ALBUMIN (test code = ALB)  g/dL        3.4-5.0                    

 

             GLOBULIN (test code = GLOB)  gram/dL     2.7-4.2                   

 

 

             ALBUMIN/GLOBULIN RATIO (test code = A/G)              0.75-1.50    

              

 

             CALCIUM (test code = CA)  mg/dL       8.5-10.1                   

 

             BILIRUBIN TOTAL (test code = BILT)  mg/dL       0.0-1.0            

        

 

             SGOT/AST (test code = AST)  IUnit/L     15-37                      

 

             SGPT/ALT (test code = ALT)  IUnit/L     12-78                      

 

             ALKALINE PHOSPHATASE TOTAL (test code = ALKP)  IUnit/L       

                   





[id number] 05:20:00* 



             Test Item    Value        Reference Range Interpretation Comments

 

             MAGNESIUM (test code = MAG)  mg/dL       1.8-2.4                   

 





CBC W/AUTO [id number] 05:11:00* 



             Test Item    Value        Reference Range Interpretation Comments

 

             WHITE BLOOD CELL (test code = WBC) 5.1 K/mm3    4.5-12.5     N     

        

 

             RED BLOOD CELL (test code = RBC) 3.47 mill/mm3 3.7-5.2      L      

       

 

             HEMOGLOBIN (test code = HGB) 11.4 gram/dL 11.5-15.5    L           

  

 

             HEMATOCRIT (test code = HCT) 34.7 %       36.0-46.0    L           

  

 

             MEAN CELL VOLUME (test code = MCV) 100.0 fL     80-98        H     

        

 

             MEAN CELL HGB (test code = MCH) 32.9 picogram 27.0-33.0    N       

      

 

             MEAN CELL HGB CONCETRATION (test code = MCHC) 32.9 gram/dL 33.0-36.

0    L             

 

             RED CELL DISTRIBUTION WIDTH (test code = RDW) 12.5 %       11.6-16.

2    N             

 

             RED CELL DISTRIBUTION WIDTH SD (test code = RDW-SD) 45.5 fL      37

.0-51.0    N             

 

             PLATELET COUNT (test code = PLT) 89 K/mm3     150-450      L       

      

 

             MEAN PLATELET VOLUME (test code = MPV) 11.3 fL      6.7-11.0     H 

            

 

             NEUTROPHIL % (test code = NT%) 42.7 %       39.0-69.0    N         

    

 

             IMMATURE GRANULOCYTE % (test code = IG%) 0.6 %        0.0-5.0      

N             

 

             LYMPHOCYTE % (test code = LY%) 36.6 %       25.0-55.0    N         

    

 

             MONOCYTE % (test code = MO%) 17.9 %       0.0-10.0     H           

  

 

             EOSINOPHIL % (test code = EO%) 1.6 %        0.0-5.0      N         

    

 

             BASOPHIL % (test code = BA%) 0.6 %        0.0-1.0      N           

  

 

             NUCLEATED RBC % (test code = NRBC%) 0.0 %        0-0          N    

         

 

             NEUTROPHIL # (test code = NT#) 2.17 K/mm3   1.8-7.7      N         

    

 

             IMMATURE GRANULOCYTE # (test code = IG#) 0.03 x10 3/uL 0-0.03      

 N             

 

             LYMPHOCYTE # (test code = LY#) 1.86 K/mm3   1.0-5.0      N         

    

 

             MONOCYTE # (test code = MO#) 0.91 K/mm3   0-0.8        H           

  

 

             EOSINOPHIL # (test code = EO#) 0.08 K/mm3   0.0-0.5      N         

    

 

             BASOPHIL # (test code = BA#) 0.03 K/mm3   0.0-0.2      N           

  

 

             NUCLEATED RBC # (test code = NRBC#) 0.00 K/mm3   0.0-0.1      N    

         

 

             MANUAL DIFF REQUIRED (test code = MDIFF) NO, ONLY SCAN NEEDED      

                      





DIFFERENTIAL [id number] 05:11:00* 



             Test Item    Value        Reference Range Interpretation Comments

 

             STAIN ACCEPTABILITY (test code = STN ACCEPTABLE)                   

                      

 

             CABOT RINGS (test code = CAB)                                      

   

 

             MORPHOLOGY COMMENT (test code = MOC)                               

          

 

             PLATELET ESTIMATE (test code = PLTEST)                             

            

 

             PLATELET MORPHOLOGY (test code = PLTMORPH)                         

                





CBC W/AUTO [id number] 05:11:00* 



             Test Item    Value        Reference Range Interpretation Comments

 

             WHITE BLOOD CELL (test code = WBC) 5.1 K/mm3    4.5-12.5     N     

        

 

             RED BLOOD CELL (test code = RBC) 3.47 mill/mm3 3.7-5.2      L      

       

 

             HEMOGLOBIN (test code = HGB) 11.4 gram/dL 11.5-15.5    L           

  

 

             HEMATOCRIT (test code = HCT) 34.7 %       36.0-46.0    L           

  

 

             MEAN CELL VOLUME (test code = MCV) 100.0 fL     80-98        H     

        

 

             MEAN CELL HGB (test code = MCH) 32.9 picogram 27.0-33.0    N       

      

 

             MEAN CELL HGB CONCETRATION (test code = MCHC) 32.9 gram/dL 33.0-36.

0    L             

 

             RED CELL DISTRIBUTION WIDTH (test code = RDW) 12.5 %       11.6-16.

2    N             

 

             RED CELL DISTRIBUTION WIDTH SD (test code = RDW-SD) 45.5 fL      37

.0-51.0    N             

 

             PLATELET COUNT (test code = PLT) 89 K/mm3     150-450      L       

      

 

             MEAN PLATELET VOLUME (test code = MPV) 11.3 fL      6.7-11.0     H 

            

 

             NEUTROPHIL % (test code = NT%) 42.7 %       39.0-69.0    N         

    

 

             IMMATURE GRANULOCYTE % (test code = IG%) 0.6 %        0.0-5.0      

N             

 

             LYMPHOCYTE % (test code = LY%) 36.6 %       25.0-55.0    N         

    

 

             MONOCYTE % (test code = MO%) 17.9 %       0.0-10.0     H           

  

 

             EOSINOPHIL % (test code = EO%) 1.6 %        0.0-5.0      N         

    

 

             BASOPHIL % (test code = BA%) 0.6 %        0.0-1.0      N           

  

 

             NUCLEATED RBC % (test code = NRBC%) 0.0 %        0-0          N    

         

 

             NEUTROPHIL # (test code = NT#) 2.17 K/mm3   1.8-7.7      N         

    

 

             IMMATURE GRANULOCYTE # (test code = IG#) 0.03 x10 3/uL 0-0.03      

 N             

 

             LYMPHOCYTE # (test code = LY#) 1.86 K/mm3   1.0-5.0      N         

    

 

             MONOCYTE # (test code = MO#) 0.91 K/mm3   0-0.8        H           

  

 

             EOSINOPHIL # (test code = EO#) 0.08 K/mm3   0.0-0.5      N         

    

 

             BASOPHIL # (test code = BA#) 0.03 K/mm3   0.0-0.2      N           

  

 

             NUCLEATED RBC # (test code = NRBC#) 0.00 K/mm3   0.0-0.1      N    

         

 

             MANUAL DIFF REQUIRED (test code = MDIFF) NO, ONLY SCAN NEEDED      

                      





DIFFERENTIAL [id number] 05:11:00* 



             Test Item    Value        Reference Range Interpretation Comments

 

             STAIN ACCEPTABILITY (test code = STN ACCEPTABLE)                   

                      

 

             CABOT RINGS (test code = CAB)                                      

   

 

             MORPHOLOGY COMMENT (test code = MOC)                               

          

 

             PLATELET ESTIMATE (test code = PLTEST)                             

            

 

             PLATELET MORPHOLOGY (test code = PLTMORPH)                         

                





CBC W/AUTO [id number] 05:11:00* 



             Test Item    Value        Reference Range Interpretation Comments

 

             WHITE BLOOD CELL (test code = WBC) 5.1 K/mm3    4.5-12.5     N     

        

 

             RED BLOOD CELL (test code = RBC) 3.47 mill/mm3 3.7-5.2      L      

       

 

             HEMOGLOBIN (test code = HGB) 11.4 gram/dL 11.5-15.5    L           

  

 

             HEMATOCRIT (test code = HCT) 34.7 %       36.0-46.0    L           

  

 

             MEAN CELL VOLUME (test code = MCV) 100.0 fL     80-98        H     

        

 

             MEAN CELL HGB (test code = MCH) 32.9 picogram 27.0-33.0    N       

      

 

             MEAN CELL HGB CONCETRATION (test code = MCHC) 32.9 gram/dL 33.0-36.

0    L             

 

             RED CELL DISTRIBUTION WIDTH (test code = RDW) 12.5 %       11.6-16.

2    N             

 

             RED CELL DISTRIBUTION WIDTH SD (test code = RDW-SD) 45.5 fL      37

.0-51.0    N             

 

             PLATELET COUNT (test code = PLT) 89 K/mm3     150-450      L       

      

 

             MEAN PLATELET VOLUME (test code = MPV) 11.3 fL      6.7-11.0     H 

            

 

             NEUTROPHIL % (test code = NT%) 42.7 %       39.0-69.0    N         

    

 

             IMMATURE GRANULOCYTE % (test code = IG%) 0.6 %        0.0-5.0      

N             

 

             LYMPHOCYTE % (test code = LY%) 36.6 %       25.0-55.0    N         

    

 

             MONOCYTE % (test code = MO%) 17.9 %       0.0-10.0     H           

  

 

             EOSINOPHIL % (test code = EO%) 1.6 %        0.0-5.0      N         

    

 

             BASOPHIL % (test code = BA%) 0.6 %        0.0-1.0      N           

  

 

             NUCLEATED RBC % (test code = NRBC%) 0.0 %        0-0          N    

         

 

             NEUTROPHIL # (test code = NT#) 2.17 K/mm3   1.8-7.7      N         

    

 

             IMMATURE GRANULOCYTE # (test code = IG#) 0.03 x10 3/uL 0-0.03      

 N             

 

             LYMPHOCYTE # (test code = LY#) 1.86 K/mm3   1.0-5.0      N         

    

 

             MONOCYTE # (test code = MO#) 0.91 K/mm3   0-0.8        H           

  

 

             EOSINOPHIL # (test code = EO#) 0.08 K/mm3   0.0-0.5      N         

    

 

             BASOPHIL # (test code = BA#) 0.03 K/mm3   0.0-0.2      N           

  

 

             NUCLEATED RBC # (test code = NRBC#) 0.00 K/mm3   0.0-0.1      N    

         

 

             MANUAL DIFF REQUIRED (test code = MDIFF) NO, ONLY SCAN NEEDED      

                      





DIFFERENTIAL [id number] 05:11:00* 



             Test Item    Value        Reference Range Interpretation Comments

 

             STAIN ACCEPTABILITY (test code = STN ACCEPTABLE)                   

                      

 

             MORPHOLOGY COMMENT (test code = MOC)                               

          

 

             PLATELET ESTIMATE (test code = PLTEST)                             

            

 

             PLATELET MORPHOLOGY (test code = PLTMORPH)                         

                





CBC W/AUTO [id number] 05:11:00* 



             Test Item    Value        Reference Range Interpretation Comments

 

             WHITE BLOOD CELL (test code = WBC) 5.1 K/mm3    4.5-12.5     N     

        

 

             RED BLOOD CELL (test code = RBC) 3.47 mill/mm3 3.7-5.2      L      

       

 

             HEMOGLOBIN (test code = HGB) 11.4 gram/dL 11.5-15.5    L           

  

 

             HEMATOCRIT (test code = HCT) 34.7 %       36.0-46.0    L           

  

 

             MEAN CELL VOLUME (test code = MCV) 100.0 fL     80-98        H     

        

 

             MEAN CELL HGB (test code = MCH) 32.9 picogram 27.0-33.0    N       

      

 

             MEAN CELL HGB CONCETRATION (test code = MCHC) 32.9 gram/dL 33.0-36.

0    L             

 

             RED CELL DISTRIBUTION WIDTH (test code = RDW) 12.5 %       11.6-16.

2    N             

 

             RED CELL DISTRIBUTION WIDTH SD (test code = RDW-SD) 45.5 fL      37

.0-51.0    N             

 

             PLATELET COUNT (test code = PLT) 89 K/mm3     150-450      L       

      

 

             MEAN PLATELET VOLUME (test code = MPV) 11.3 fL      6.7-11.0     H 

            

 

             NEUTROPHIL % (test code = NT%) 42.7 %       39.0-69.0    N         

    

 

             IMMATURE GRANULOCYTE % (test code = IG%) 0.6 %        0.0-5.0      

N             

 

             LYMPHOCYTE % (test code = LY%) 36.6 %       25.0-55.0    N         

    

 

             MONOCYTE % (test code = MO%) 17.9 %       0.0-10.0     H           

  

 

             EOSINOPHIL % (test code = EO%) 1.6 %        0.0-5.0      N         

    

 

             BASOPHIL % (test code = BA%) 0.6 %        0.0-1.0      N           

  

 

             NUCLEATED RBC % (test code = NRBC%) 0.0 %        0-0          N    

         

 

             NEUTROPHIL # (test code = NT#) 2.17 K/mm3   1.8-7.7      N         

    

 

             IMMATURE GRANULOCYTE # (test code = IG#) 0.03 x10 3/uL 0-0.03      

 N             

 

             LYMPHOCYTE # (test code = LY#) 1.86 K/mm3   1.0-5.0      N         

    

 

             MONOCYTE # (test code = MO#) 0.91 K/mm3   0-0.8        H           

  

 

             EOSINOPHIL # (test code = EO#) 0.08 K/mm3   0.0-0.5      N         

    

 

             BASOPHIL # (test code = BA#) 0.03 K/mm3   0.0-0.2      N           

  

 

             NUCLEATED RBC # (test code = NRBC#) 0.00 K/mm3   0.0-0.1      N    

         

 

             MANUAL DIFF REQUIRED (test code = MDIFF) NO, ONLY SCAN NEEDED      

                      





DIFFERENTIAL [id number] 05:11:00* 



             Test Item    Value        Reference Range Interpretation Comments

 

             STAIN ACCEPTABILITY (test code = STN ACCEPTABLE)                   

                      

 

             CABOT RINGS (test code = CAB)                                      

   

 

             MORPHOLOGY COMMENT (test code = MOC)                               

          

 

             PLATELET ESTIMATE (test code = PLTEST)                             

            

 

             PLATELET MORPHOLOGY (test code = PLTMORPH)                         

                





ACUTE HEPATITIS [id number] 07:10:00* 



             Test Item    Value        Reference Range Interpretation Comments

 

             AB HEPATITIS A IGM (test code = HAVMAB) Negative     Negative      

             

 

             AG HEPAT B SURF (test code = HBSAG) Negative     Negative          

         

 

             HEPATITIS B CORE ANTIBODY,IGM (test code = HBCMAB) Negative     Neg

ative                   

 

             AB HEPATITIS C (test code = HCVAB) >11.0        0.0-0.9      A     

       INFCE Result Units: s/co 

ratio                                  Negative:     < 0.8                      
      Indeterminate: 0.8 - 0.9                                  Positive:     > 
0.9 The CDC recommends that a positive HCV antibody result be followed up with a
HCV Nucleic Acid Amplification test (825516).Performed At:  [facility] 
[address] [id number] Jose Mnauel PRO MD Ph:[phone]





COMPREHENSIVE METABOLIC [id number] 06:12:00* 



             Test Item    Value        Reference Range Interpretation Comments

 

             SODIUM (test code = NA) 139 mmol/L   136-145      N             

 

             POTASSIUM (test code = K) 2.6 mmol/L   3.5-5.1                 Re

sults called to [id number] by 

V.LAB.GP 20 0611Critical results verified and read back by Nurse? Y

 

             CHLORIDE (test code = CL) 104.0 mmol/L        N             

 

             CARBON DIOXIDE (test code = CO2) 27.0 mmol/L  21-32        N       

      

 

             ANION GAP (test code = GAP) 10.6         10-20        N            

 

 

             GLUCOSE (test code = GLU) 97 mg/dL            N             

 

             BLOOD UREA NITROGEN (test code = BUN) 4 mg/dL      7-18         L  

           

 

             GLOMERULAR FILTRATION RATE (test code = GFR) > 60 mL/min  >=60     

                 Estimated GFR by

using Modified MDRD formula.Chronic kidney disease is defined as either kidney 
damageor GFR <60 mL/min/1.73 m2 for >3 months.

 

             CREATININE (test code = CREAT) 0.60 mg/dL   0.55-1.02    N         

   **Note change in reference

range due to change in reagent.**

 

             BUN/CREATININE RATIO (test code = BUN/CREA) 6.7          10-20     

   L             

 

             TOTAL PROTEIN (test code = PROT) 6.7 gram/dL  6.4-8.2      N       

      

 

             ALBUMIN (test code = ALB) 2.9 g/dL     3.4-5.0      L             

 

             GLOBULIN (test code = GLOB) 3.8 gram/dL  2.7-4.2      N            

 

 

             ALBUMIN/GLOBULIN RATIO (test code = A/G) 0.8          0.75-1.50    

N             

 

             CALCIUM (test code = CA) 8.2 mg/dL    8.5-10.1     L             

 

             BILIRUBIN TOTAL (test code = BILT) 1.10 mg/dL   0.0-1.0      H     

        

 

             SGOT/AST (test code = AST) 54 IUnit/L   15-37        H             

 

             SGPT/ALT (test code = ALT) 38 IUnit/L   12-78        N             

 

             ALKALINE PHOSPHATASE TOTAL (test code = ALKP) 74 IUnit/L     

     N            **Note change 

in reference range due to change in reagent.**





[id number] 06:12:00* 



             Test Item    Value        Reference Range Interpretation Comments

 

             MAGNESIUM (test code = MAG) 1.9 mg/dL    1.8-2.4      N            

 





BASIC METABOLIC [id number] 17:25:00* 



             Test Item    Value        Reference Range Interpretation Comments

 

             SODIUM (test code = NA) 136 mmol/L   136-145      N             

 

             POTASSIUM (test code = K) 3.4 mmol/L   3.5-5.1      L             

 

             CHLORIDE (test code = CL) 100.0 mmol/L        N             

 

             CARBON DIOXIDE (test code = CO2) 28.0 mmol/L  21-32        N       

      

 

             ANION GAP (test code = GAP) 11.4         10-20        N            

 

 

             GLUCOSE (test code = GLU) 104 mg/dL           N             

 

             BLOOD UREA NITROGEN (test code = BUN) 3 mg/dL      7-18         L  

           

 

             GLOMERULAR FILTRATION RATE (test code = GFR) > 60 mL/min  >=60     

                 Estimated GFR by

using Modified MDRD formula.Chronic kidney disease is defined as either kidney 
damageor GFR <60 mL/min/1.73 m2 for >3 months.

 

             CREATININE (test code = CREAT) 0.70 mg/dL   0.55-1.02    N         

   **Note change in reference

range due to change in reagent.**

 

             BUN/CREATININE RATIO (test code = BUN/CREA) 4.3          10-20     

   L             

 

             CALCIUM (test code = CA) 8.1 mg/dL    8.5-10.1     L             





[id number] 17:25:00* 



             Test Item    Value        Reference Range Interpretation Comments

 

             MAGNESIUM (test code = MAG) 2.0 mg/dL    1.8-2.4      N            

 





- HEPA IMAG INCL GB W [id number] 08:33:00 FAX: Karen May MD   
[phone]    Tensed:    St: ADM FAX:         Azucena Obrien 
[phone]   ----------------------------------------
---------------------------------------  Name:   REYES,JOSEPHINE                
 [facility]                     : 1955  Age/S: 64/F           [address] 
[facility]                Unit #: [id number]      Loc: [id number]       [place]  [address]              Phys: Azucena Obrien                               
                Acct: [id number] Dis Date:               Status: ADM IN       
                         PHONE #: [phone]     Exam Date:     2020    
0832                   FAX #: [phone]     Reason: abd pain, n/v            
                         EXAMS:                                               
CPT CODE:      [id number] HEPA IMAG INCL GB W PHA                    [id number]       
            HISTORY: Abdominal pain, n/v               EXAM:  NUCLEAR MEDICINE 
HEPATOBILIARY SCAN WITH GB EJECTION FRACTION.               TECHNIQUE: After IV 
injection of 5.5 mCi Tc 99m Choletec, sequential       planar images were obt
ained over the upper abdomen out to 60 minutes.       1.2 micrograms of IV CCK w
as then given with continued imaging for 30       minutes for calculation of gal
lbladder ejection fraction. Comparison       20.               FINDINGS:  H
omogeneous distribution of radiopharmaceutical in the       liver. Normal accumu
lation of radiopharmaceutical in the gallbladder       by 15 minutes. Normal acc
umulation of radiopharmaceutical in small       bowel by 15 minutes.            
  After the IV administration of Kinevac, calculated gallbladder       ejection 
fraction is 13%.                 IMPRESSION:                    1. No evidence 
of acute cholecystitis or biliary obstruction.          2. Abnormally depressed 
gallbladder ejection fraction. This is a         nonspecific finding which can b
e seen with chronic cholecystitis,         biliary dyskinesia, or medication eff
ect. Correlate clinically.                                        LOCATION: [facility]  
                            ** Electronically Signed by Araceli Santos D.O. on  at 0833 **                      Reported and signed by: Araceli Santos D.O.    
    CC: Karen Reyez MD; Azucena Obrien                                
                                                               Technologist: MONIQUE COLES                                         Trnscrd Date/Time/By: 2020 (0833) : By: [email or web address]          Orig Print D/T: S: 2020 (0837)    
                    PAGE  1                       Signed Report                 
             BASIC METABOLIC [id number] 17:00:00* 



             Test Item    Value        Reference Range Interpretation Comments

 

             SODIUM (test code = NA) 136 mmol/L   136-145                   RESU

LT VERIFIED BY REPEAT ANALYSIS

 

             POTASSIUM (test code = K) 2.3 mmol/L   3.5-5.1                 Re

sults called to [id number] by 

V.LAB.SPR 20 1700Critical results verified and read back by Nurse? Y

 

             CHLORIDE (test code = CL) 98.0 mmol/L         N             

 

             CARBON DIOXIDE (test code = CO2) 28.0 mmol/L  21-32        N       

      

 

             ANION GAP (test code = GAP) 12.3         10-20        N            

 

 

             GLUCOSE (test code = GLU) 161 mg/dL           H             

 

             BLOOD UREA NITROGEN (test code = BUN) 4 mg/dL      7-18         L  

           

 

             GLOMERULAR FILTRATION RATE (test code = GFR) > 60 mL/min  >=60     

                 Estimated GFR by

using Modified MDRD formula.Chronic kidney disease is defined as either kidney 
damageor GFR <60 mL/min/1.73 m2 for >3 months.

 

             CREATININE (test code = CREAT) 0.80 mg/dL   0.55-1.02    N         

   **Note change in reference

range due to change in reagent.**

 

             BUN/CREATININE RATIO (test code = BUN/CREA) 5.0          10-20     

   L             

 

             CALCIUM (test code = CA) 8.4 mg/dL    8.5-10.1     L             





[id number] 17:00:00* 



             Test Item    Value        Reference Range Interpretation Comments

 

             MAGNESIUM (test code = MAG) 1.4 mg/dL    1.8-2.4      L            

 





CBC W/AUTO [id number] 05:26:00* 



             Test Item    Value        Reference Range Interpretation Comments

 

             WHITE BLOOD CELL (test code = WBC) 12.1 K/mm3   4.5-12.5     N     

        

 

             RED BLOOD CELL (test code = RBC) 4.19 mill/mm3 3.7-5.2      N      

       

 

             HEMOGLOBIN (test code = HGB) 13.7 gram/dL 11.5-15.5                

 RESULT VERIFIED BY REPEAT 

ANALYSIS

 

             HEMATOCRIT (test code = HCT) 39.9 %       36.0-46.0    N           

  

 

             MEAN CELL VOLUME (test code = MCV) 95.2 fL      80-98        N     

        

 

             MEAN CELL HGB (test code = MCH) 32.7 picogram 27.0-33.0    N       

      

 

             MEAN CELL HGB CONCETRATION (test code = MCHC) 34.3 gram/dL 33.0-36.

0    N             

 

             RED CELL DISTRIBUTION WIDTH (test code = RDW) 13.0 %       11.6-16.

2    N             

 

             RED CELL DISTRIBUTION WIDTH SD (test code = RDW-SD) 45.6 fL      37

.0-51.0    N             

 

             PLATELET COUNT (test code = PLT) 89 K/mm3     150-450      L       

      

 

             MEAN PLATELET VOLUME (test code = MPV) 11.4 fL      6.7-11.0     H 

            

 

             NEUTROPHIL % (test code = NT%) 69.0 %       39.0-69.0    N         

    

 

             IMMATURE GRANULOCYTE % (test code = IG%) 0.6 %        0.0-5.0      

N             

 

             LYMPHOCYTE % (test code = LY%) 21.5 %       25.0-55.0    L         

    

 

             MONOCYTE % (test code = MO%) 8.7 %        0.0-10.0     N           

  

 

             EOSINOPHIL % (test code = EO%) 0.0 %        0.0-5.0      N         

    

 

             BASOPHIL % (test code = BA%) 0.2 %        0.0-1.0      N           

  

 

             NUCLEATED RBC % (test code = NRBC%) 0.0 %        0-0          N    

         

 

             NEUTROPHIL # (test code = NT#) 8.34 K/mm3   1.8-7.7      H         

    

 

             IMMATURE GRANULOCYTE # (test code = IG#) 0.07 x10 3/uL 0-0.03      

 H             

 

             LYMPHOCYTE # (test code = LY#) 2.59 K/mm3   1.0-5.0      N         

    

 

             MONOCYTE # (test code = MO#) 1.05 K/mm3   0-0.8        H           

  

 

             EOSINOPHIL # (test code = EO#) 0.00 K/mm3   0.0-0.5      N         

    

 

             BASOPHIL # (test code = BA#) 0.02 K/mm3   0.0-0.2      N           

  

 

             NUCLEATED RBC # (test code = NRBC#) 0.00 K/mm3   0.0-0.1      N    

         

 

             MANUAL DIFF REQUIRED (test code = MDIFF) NO, ONLY SCAN NEEDED      

                      





DIFFERENTIAL [id number] 05:26:00* 



             Test Item    Value        Reference Range Interpretation Comments

 

             STAIN ACCEPTABILITY (test code = STN ACCEPTABLE) STAIN ACCEPTABLE  

                          

 

             ANISOCYTOSIS (test code = ANISO) 1+                                

      

 

             MACROCYTOSIS (test code = MACR) 1+                                 

     

 

             MORPHOLOGY COMMENT (test code = MOC) TEST NOT PERFORMED            

                

 

             PLATELET ESTIMATE (test code = PLTEST) ADEQUATE                    

            

 

             PLATELET MORPHOLOGY (test code = PLTMORPH) NORMAL                  

                





COMPREHENSIVE METABOLIC [id number] 05:24:00* 



             Test Item    Value        Reference Range Interpretation Comments

 

             SODIUM (test code = NA) 145 mmol/L   136-145                   RESU

LT VERIFIED BY REPEAT ANALYSIS

 

             POTASSIUM (test code = K) 2.7 mmol/L   3.5-5.1      LL           Re

sults called to [id number] by 

V.LAB.GP 20 0523Critical results verified and read back by Nurse? Y

 

             CHLORIDE (test code = CL) 93.0 mmol/L         L             

 

             CARBON DIOXIDE (test code = CO2) 34.0 mmol/L  21-32        H       

      

 

             ANION GAP (test code = GAP) 20.7         10-20        H            

 

 

             GLUCOSE (test code = GLU) 108 mg/dL           H             

 

             BLOOD UREA NITROGEN (test code = BUN) 7 mg/dL      7-18         N  

           

 

             GLOMERULAR FILTRATION RATE (test code = GFR) > 60 mL/min  >=60     

                 Estimated GFR by

using Modified MDRD formula.Chronic kidney disease is defined as either kidney 
damageor GFR <60 mL/min/1.73 m2 for >3 months.

 

             CREATININE (test code = CREAT) 0.90 mg/dL   0.55-1.02    N         

   **Note change in reference

range due to change in reagent.**

 

             BUN/CREATININE RATIO (test code = BUN/CREA) 7.8          10-20     

   L             

 

             TOTAL PROTEIN (test code = PROT) 7.8 gram/dL  6.4-8.2      N       

      

 

             ALBUMIN (test code = ALB) 3.2 g/dL     3.4-5.0      L             

 

             GLOBULIN (test code = GLOB) 4.6 gram/dL  2.7-4.2      H            

 

 

             ALBUMIN/GLOBULIN RATIO (test code = A/G) 0.7          0.75-1.50    

L             

 

             CALCIUM (test code = CA) 8.3 mg/dL    8.5-10.1     L             

 

             BILIRUBIN TOTAL (test code = BILT) 1.40 mg/dL   0.0-1.0      H     

        

 

             SGOT/AST (test code = AST) 55 IUnit/L   15-37        H             

 

             SGPT/ALT (test code = ALT) 36 IUnit/L   12-78        N             

 

             ALKALINE PHOSPHATASE TOTAL (test code = ALKP) 89 IUnit/L     

     N            **Note change 

in reference range due to change in reagent.**





LIPID PROFILE (CORONARY RISK)2020 05:24:00* 



             Test Item    Value        Reference Range Interpretation Comments

 

             TRIGLYCERIDES (test code = TRIG) 79 mg/dL            N       

      

 

             CHOLESTEROL (test code = CHOL) 178 mg/dL    0-200        N         

    

 

             CHOLESTEROL/HDL RATIO (test code = CHOLHDL) 2.0 RATIO    0-4.9     

   N            RISK ASSOCIATED 

WITH CHOL/HDL RATIOS:     Risk          Male       Female1/2 AVERAGE        3.43
       3.27AVERAGE            4.97        4.442X AVERAGE         9.55        
7.053X AVERAGE         23.39      11.04 REFERENCE VALUE IS RELATED TO RISK 
LEVELS ASRECOMMENDED BY THE [facility]. HEART, LUNG, AND BLOOD INST.

 

             HDL CHOLESTEROL (test code = HDL) 86 mg/dL     40-60        H      

       

 

             LIPOPROTEIN LDL (test code = LDL) 80 mg/dL     100-129      L      

      

===========================================================Reference Interval:  
       mg/dL          
mmol/L-----------------------------------------------------------Optimal        
             <100           <2.6Near/above optimal          100-129        2.6-
3.3Borderline High             130-159        3.4-4.1High                       
160-189        4.1-4.9Very High                    &gt;=190          
>=4.9========= This LDL result is a direct measurement.=========





[id number] 05:24:00* 



             Test Item    Value        Reference Range Interpretation Comments

 

             MAGNESIUM (test code = MAG) 1.7 mg/dL    1.8-2.4      L            

 





THYROID STIMULATING [id number] 05:24:00* 



             Test Item    Value        Reference Range Interpretation Comments

 

             THYROID STIMULATING HORMONE (test code = TSH) 0.876 uIU/mL 0.36-3.7

4    N            TSH 

REFERENCE RANGES:  EUTHYROID:     0.35 - 4.3 mIU/mL                       HYPO  
  :     > 5.5      mIU/mL                       HYPER    :     < 0.35     mIU/mL





COMPREHENSIVE METABOLIC [id number] 05:19:00* 



             Test Item    Value        Reference Range Interpretation Comments

 

             SODIUM (test code = NA)  mmol/L      136-145                    

 

             POTASSIUM (test code = K)  mmol/L      3.5-5.1                    

 

             CHLORIDE (test code = CL)  mmol/L                           

 

             CARBON DIOXIDE (test code = CO2) 34.0 mmol/L  21-32        H       

      

 

             ANION GAP (test code = GAP)              10-20                     

 

 

             GLUCOSE (test code = GLU) 108 mg/dL           H             

 

             BLOOD UREA NITROGEN (test code = BUN) 7 mg/dL      7-18         N  

           

 

             GLOMERULAR FILTRATION RATE (test code = GFR) > 60 mL/min  >=60     

                 Estimated GFR by

using Modified MDRD formula.Chronic kidney disease is defined as either kidney 
damageor GFR <60 mL/min/1.73 m2 for >3 months.

 

             CREATININE (test code = CREAT) 0.90 mg/dL   0.55-1.02    N         

   **Note change in reference

range due to change in reagent.**

 

             BUN/CREATININE RATIO (test code = BUN/CREA) 7.8          10-20     

   L             

 

             TOTAL PROTEIN (test code = PROT) 7.8 gram/dL  6.4-8.2      N       

      

 

             ALBUMIN (test code = ALB) 3.2 g/dL     3.4-5.0      L             

 

             GLOBULIN (test code = GLOB) 4.6 gram/dL  2.7-4.2      H            

 

 

             ALBUMIN/GLOBULIN RATIO (test code = A/G) 0.7          0.75-1.50    

L             

 

             CALCIUM (test code = CA) 8.3 mg/dL    8.5-10.1     L             

 

             BILIRUBIN TOTAL (test code = BILT) 1.40 mg/dL   0.0-1.0      H     

        

 

             SGOT/AST (test code = AST) 55 IUnit/L   15-37        H             

 

             SGPT/ALT (test code = ALT) 36 IUnit/L   12-78        N             

 

             ALKALINE PHOSPHATASE TOTAL (test code = ALKP) 89 IUnit/L     

     N            **Note change 

in reference range due to change in reagent.**





LIPID PROFILE (CORONARY RISK)2020 05:19:00* 



             Test Item    Value        Reference Range Interpretation Comments

 

             TRIGLYCERIDES (test code = TRIG) 79 mg/dL            N       

      

 

             CHOLESTEROL (test code = CHOL) 178 mg/dL    0-200        N         

    

 

             CHOLESTEROL/HDL RATIO (test code = CHOLHDL) 2.0 RATIO    0-4.9     

   N            RISK ASSOCIATED 

WITH CHOL/HDL RATIOS:     Risk          Male       Female1/2 AVERAGE        3.43
       3.27AVERAGE            4.97        4.442X AVERAGE         9.55        
7.053X AVERAGE         23.39      11.04 REFERENCE VALUE IS RELATED TO RISK 
LEVELS ASRECOMMENDED BY THE [facility]. HEART, LUNG, AND BLOOD INST.

 

             HDL CHOLESTEROL (test code = HDL) 86 mg/dL     40-60        H      

       

 

             LIPOPROTEIN LDL (test code = LDL) 80 mg/dL     100-129      L      

      

===========================================================Reference Interval:  
       mg/dL          
mmol/L-----------------------------------------------------------Optimal        
             <100           <2.6Near/above optimal          100-129        2.6-
3.3Borderline High             130-159        3.4-4.1High                       
160-189        4.1-4.9Very High                    &gt;=190          
>=4.9========= This LDL result is a direct measurement.=========





[id number] 05:19:00* 



             Test Item    Value        Reference Range Interpretation Comments

 

             MAGNESIUM (test code = MAG) 1.7 mg/dL    1.8-2.4      L            

 





THYROID STIMULATING [id number] 05:19:00* 



             Test Item    Value        Reference Range Interpretation Comments

 

             THYROID STIMULATING HORMONE (test code = TSH) 0.876 uIU/mL 0.36-3.7

4    N            TSH 

REFERENCE RANGES:  EUTHYROID:     0.35 - 4.3 mIU/mL                       HYPO  
  :     > 5.5      mIU/mL                       HYPER    :     < 0.35     mIU/mL





[id number] 05:15:00* 



             Test Item    Value        Reference Range Interpretation Comments

 

             GLYCOSYLATED HEMOGLOBIN (HA1C) (test code = GLYHGB) 5.2 % HbA1     

                        SUGGESTED 

DIAGNOSIS:    HbA1C (%)----------------------  -----------Diabetic              
 >6.4Prediabetes              5.7 - 6.4Normal                  <5.7

 

             ESTIMATED AVERAGE GLUCOSE (test code = EAG) 103 MG/DL              

                 





CBC W/AUTO [id number] 04:56:00* 



             Test Item    Value        Reference Range Interpretation Comments

 

             WHITE BLOOD CELL (test code = WBC) 12.1 K/mm3   4.5-12.5     N     

        

 

             RED BLOOD CELL (test code = RBC) 4.19 mill/mm3 3.7-5.2      N      

       

 

             HEMOGLOBIN (test code = HGB) 13.7 gram/dL 11.5-15.5                

 RESULT VERIFIED BY REPEAT 

ANALYSIS

 

             HEMATOCRIT (test code = HCT) 39.9 %       36.0-46.0    N           

  

 

             MEAN CELL VOLUME (test code = MCV) 95.2 fL      80-98        N     

        

 

             MEAN CELL HGB (test code = MCH) 32.7 picogram 27.0-33.0    N       

      

 

             MEAN CELL HGB CONCETRATION (test code = MCHC) 34.3 gram/dL 33.0-36.

0    N             

 

             RED CELL DISTRIBUTION WIDTH (test code = RDW) 13.0 %       11.6-16.

2    N             

 

             RED CELL DISTRIBUTION WIDTH SD (test code = RDW-SD) 45.6 fL      37

.0-51.0    N             

 

             PLATELET COUNT (test code = PLT) 89 K/mm3     150-450      L       

      

 

             MEAN PLATELET VOLUME (test code = MPV) 11.4 fL      6.7-11.0     H 

            

 

             NEUTROPHIL % (test code = NT%) 69.0 %       39.0-69.0    N         

    

 

             IMMATURE GRANULOCYTE % (test code = IG%) 0.6 %        0.0-5.0      

N             

 

             LYMPHOCYTE % (test code = LY%) 21.5 %       25.0-55.0    L         

    

 

             MONOCYTE % (test code = MO%) 8.7 %        0.0-10.0     N           

  

 

             EOSINOPHIL % (test code = EO%) 0.0 %        0.0-5.0      N         

    

 

             BASOPHIL % (test code = BA%) 0.2 %        0.0-1.0      N           

  

 

             NUCLEATED RBC % (test code = NRBC%) 0.0 %        0-0          N    

         

 

             NEUTROPHIL # (test code = NT#) 8.34 K/mm3   1.8-7.7      H         

    

 

             IMMATURE GRANULOCYTE # (test code = IG#) 0.07 x10 3/uL 0-0.03      

 H             

 

             LYMPHOCYTE # (test code = LY#) 2.59 K/mm3   1.0-5.0      N         

    

 

             MONOCYTE # (test code = MO#) 1.05 K/mm3   0-0.8        H           

  

 

             EOSINOPHIL # (test code = EO#) 0.00 K/mm3   0.0-0.5      N         

    

 

             BASOPHIL # (test code = BA#) 0.02 K/mm3   0.0-0.2      N           

  

 

             NUCLEATED RBC # (test code = NRBC#) 0.00 K/mm3   0.0-0.1      N    

         

 

             MANUAL DIFF REQUIRED (test code = MDIFF) NO, ONLY SCAN NEEDED      

                      





DIFFERENTIAL [id number] 04:56:00* 



             Test Item    Value        Reference Range Interpretation Comments

 

             STAIN ACCEPTABILITY (test code = STN ACCEPTABLE)                   

                      

 

             CABOT RINGS (test code = CAB)                                      

   

 

             MORPHOLOGY COMMENT (test code = MOC)                               

          

 

             PLATELET ESTIMATE (test code = PLTEST)                             

            

 

             PLATELET MORPHOLOGY (test code = PLTMORPH)                         

                





CBC W/AUTO [id number] 04:56:00* 



             Test Item    Value        Reference Range Interpretation Comments

 

             WHITE BLOOD CELL (test code = WBC) 12.1 K/mm3   4.5-12.5     N     

        

 

             RED BLOOD CELL (test code = RBC) 4.19 mill/mm3 3.7-5.2      N      

       

 

             HEMOGLOBIN (test code = HGB) 13.7 gram/dL 11.5-15.5                

 RESULT VERIFIED BY REPEAT 

ANALYSIS

 

             HEMATOCRIT (test code = HCT) 39.9 %       36.0-46.0    N           

  

 

             MEAN CELL VOLUME (test code = MCV) 95.2 fL      80-98        N     

        

 

             MEAN CELL HGB (test code = MCH) 32.7 picogram 27.0-33.0    N       

      

 

             MEAN CELL HGB CONCETRATION (test code = MCHC) 34.3 gram/dL 33.0-36.

0    N             

 

             RED CELL DISTRIBUTION WIDTH (test code = RDW) 13.0 %       11.6-16.

2    N             

 

             RED CELL DISTRIBUTION WIDTH SD (test code = RDW-SD) 45.6 fL      37

.0-51.0    N             

 

             PLATELET COUNT (test code = PLT) 89 K/mm3     150-450      L       

      

 

             MEAN PLATELET VOLUME (test code = MPV) 11.4 fL      6.7-11.0     H 

            

 

             NEUTROPHIL % (test code = NT%) 69.0 %       39.0-69.0    N         

    

 

             IMMATURE GRANULOCYTE % (test code = IG%) 0.6 %        0.0-5.0      

N             

 

             LYMPHOCYTE % (test code = LY%) 21.5 %       25.0-55.0    L         

    

 

             MONOCYTE % (test code = MO%) 8.7 %        0.0-10.0     N           

  

 

             EOSINOPHIL % (test code = EO%) 0.0 %        0.0-5.0      N         

    

 

             BASOPHIL % (test code = BA%) 0.2 %        0.0-1.0      N           

  

 

             NUCLEATED RBC % (test code = NRBC%) 0.0 %        0-0          N    

         

 

             NEUTROPHIL # (test code = NT#) 8.34 K/mm3   1.8-7.7      H         

    

 

             IMMATURE GRANULOCYTE # (test code = IG#) 0.07 x10 3/uL 0-0.03      

 H             

 

             LYMPHOCYTE # (test code = LY#) 2.59 K/mm3   1.0-5.0      N         

    

 

             MONOCYTE # (test code = MO#) 1.05 K/mm3   0-0.8        H           

  

 

             EOSINOPHIL # (test code = EO#) 0.00 K/mm3   0.0-0.5      N         

    

 

             BASOPHIL # (test code = BA#) 0.02 K/mm3   0.0-0.2      N           

  

 

             NUCLEATED RBC # (test code = NRBC#) 0.00 K/mm3   0.0-0.1      N    

         

 

             MANUAL DIFF REQUIRED (test code = MDIFF) NO, ONLY SCAN NEEDED      

                      





DIFFERENTIAL [id number] 04:56:00* 



             Test Item    Value        Reference Range Interpretation Comments

 

             STAIN ACCEPTABILITY (test code = STN ACCEPTABLE)                   

                      

 

             CABOT RINGS (test code = CAB)                                      

   

 

             MORPHOLOGY COMMENT (test code = MOC)                               

          

 

             PLATELET ESTIMATE (test code = PLTEST)                             

            

 

             PLATELET MORPHOLOGY (test code = PLTMORPH)                         

                





CBC W/AUTO [id number] 04:56:00* 



             Test Item    Value        Reference Range Interpretation Comments

 

             WHITE BLOOD CELL (test code = WBC) 12.1 K/mm3   4.5-12.5     N     

        

 

             RED BLOOD CELL (test code = RBC) 4.19 mill/mm3 3.7-5.2      N      

       

 

             HEMOGLOBIN (test code = HGB) 13.7 gram/dL 11.5-15.5                

 RESULT VERIFIED BY REPEAT 

ANALYSIS

 

             HEMATOCRIT (test code = HCT) 39.9 %       36.0-46.0    N           

  

 

             MEAN CELL VOLUME (test code = MCV) 95.2 fL      80-98        N     

        

 

             MEAN CELL HGB (test code = MCH) 32.7 picogram 27.0-33.0    N       

      

 

             MEAN CELL HGB CONCETRATION (test code = MCHC) 34.3 gram/dL 33.0-36.

0    N             

 

             RED CELL DISTRIBUTION WIDTH (test code = RDW) 13.0 %       11.6-16.

2    N             

 

             RED CELL DISTRIBUTION WIDTH SD (test code = RDW-SD) 45.6 fL      37

.0-51.0    N             

 

             PLATELET COUNT (test code = PLT) 89 K/mm3     150-450      L       

      

 

             MEAN PLATELET VOLUME (test code = MPV) 11.4 fL      6.7-11.0     H 

            

 

             NEUTROPHIL % (test code = NT%) 69.0 %       39.0-69.0    N         

    

 

             IMMATURE GRANULOCYTE % (test code = IG%) 0.6 %        0.0-5.0      

N             

 

             LYMPHOCYTE % (test code = LY%) 21.5 %       25.0-55.0    L         

    

 

             MONOCYTE % (test code = MO%) 8.7 %        0.0-10.0     N           

  

 

             EOSINOPHIL % (test code = EO%) 0.0 %        0.0-5.0      N         

    

 

             BASOPHIL % (test code = BA%) 0.2 %        0.0-1.0      N           

  

 

             NUCLEATED RBC % (test code = NRBC%) 0.0 %        0-0          N    

         

 

             NEUTROPHIL # (test code = NT#) 8.34 K/mm3   1.8-7.7      H         

    

 

             IMMATURE GRANULOCYTE # (test code = IG#) 0.07 x10 3/uL 0-0.03      

 H             

 

             LYMPHOCYTE # (test code = LY#) 2.59 K/mm3   1.0-5.0      N         

    

 

             MONOCYTE # (test code = MO#) 1.05 K/mm3   0-0.8        H           

  

 

             EOSINOPHIL # (test code = EO#) 0.00 K/mm3   0.0-0.5      N         

    

 

             BASOPHIL # (test code = BA#) 0.02 K/mm3   0.0-0.2      N           

  

 

             NUCLEATED RBC # (test code = NRBC#) 0.00 K/mm3   0.0-0.1      N    

         

 

             MANUAL DIFF REQUIRED (test code = MDIFF) NO, ONLY SCAN NEEDED      

                      





DIFFERENTIAL [id number] 04:56:00* 



             Test Item    Value        Reference Range Interpretation Comments

 

             STAIN ACCEPTABILITY (test code = STN ACCEPTABLE)                   

                      

 

             MORPHOLOGY COMMENT (test code = MOC)                               

          

 

             PLATELET ESTIMATE (test code = PLTEST)                             

            

 

             PLATELET MORPHOLOGY (test code = PLTMORPH)                         

                





CBC W/AUTO [id number] 04:56:00* 



             Test Item    Value        Reference Range Interpretation Comments

 

             WHITE BLOOD CELL (test code = WBC) 12.1 K/mm3   4.5-12.5     N     

        

 

             RED BLOOD CELL (test code = RBC) 4.19 mill/mm3 3.7-5.2      N      

       

 

             HEMOGLOBIN (test code = HGB) 13.7 gram/dL 11.5-15.5                

 RESULT VERIFIED BY REPEAT 

ANALYSIS

 

             HEMATOCRIT (test code = HCT) 39.9 %       36.0-46.0    N           

  

 

             MEAN CELL VOLUME (test code = MCV) 95.2 fL      80-98        N     

        

 

             MEAN CELL HGB (test code = MCH) 32.7 picogram 27.0-33.0    N       

      

 

             MEAN CELL HGB CONCETRATION (test code = MCHC) 34.3 gram/dL 33.0-36.

0    N             

 

             RED CELL DISTRIBUTION WIDTH (test code = RDW) 13.0 %       11.6-16.

2    N             

 

             RED CELL DISTRIBUTION WIDTH SD (test code = RDW-SD) 45.6 fL      37

.0-51.0    N             

 

             PLATELET COUNT (test code = PLT) 89 K/mm3     150-450      L       

      

 

             MEAN PLATELET VOLUME (test code = MPV) 11.4 fL      6.7-11.0     H 

            

 

             NEUTROPHIL % (test code = NT%) 69.0 %       39.0-69.0    N         

    

 

             IMMATURE GRANULOCYTE % (test code = IG%) 0.6 %        0.0-5.0      

N             

 

             LYMPHOCYTE % (test code = LY%) 21.5 %       25.0-55.0    L         

    

 

             MONOCYTE % (test code = MO%) 8.7 %        0.0-10.0     N           

  

 

             EOSINOPHIL % (test code = EO%) 0.0 %        0.0-5.0      N         

    

 

             BASOPHIL % (test code = BA%) 0.2 %        0.0-1.0      N           

  

 

             NUCLEATED RBC % (test code = NRBC%) 0.0 %        0-0          N    

         

 

             NEUTROPHIL # (test code = NT#) 8.34 K/mm3   1.8-7.7      H         

    

 

             IMMATURE GRANULOCYTE # (test code = IG#) 0.07 x10 3/uL 0-0.03      

 H             

 

             LYMPHOCYTE # (test code = LY#) 2.59 K/mm3   1.0-5.0      N         

    

 

             MONOCYTE # (test code = MO#) 1.05 K/mm3   0-0.8        H           

  

 

             EOSINOPHIL # (test code = EO#) 0.00 K/mm3   0.0-0.5      N         

    

 

             BASOPHIL # (test code = BA#) 0.02 K/mm3   0.0-0.2      N           

  

 

             NUCLEATED RBC # (test code = NRBC#) 0.00 K/mm3   0.0-0.1      N    

         

 

             MANUAL DIFF REQUIRED (test code = MDIFF) NO, ONLY SCAN NEEDED      

                      





DIFFERENTIAL [id number] 04:56:00* 



             Test Item    Value        Reference Range Interpretation Comments

 

             STAIN ACCEPTABILITY (test code = STN ACCEPTABLE)                   

                      

 

             CABOT RINGS (test code = CAB)                                      

   

 

             MORPHOLOGY COMMENT (test code = MOC)                               

          

 

             PLATELET ESTIMATE (test code = PLTEST)                             

            

 

             PLATELET MORPHOLOGY (test code = PLTMORPH)                         

                





DRUGS OF ABUSE SCREEN  16:25:00* 



             Test Item    Value        Reference Range Interpretation Comments

 

             UA PH DIPSTICK (test code = SHAWN) 8.5          5.0-8.0              

      

 

             URN COCAINE (test code = COCAURN) NEGATIVE     <300 ng/mL          

       

 

             URN CANNABINOIDS (test code = CANNABURN) POSITIVE     <50 ng/mL    

A            This test provides

only a preliminary test result.  A morespecific alternate chemical method must 
be used in order toobtain a confirmed analytical result.  Gas 
chromatography/mass spectrometry (GC/MS) is thepreferred confirmatory method.  
Other chemical confirmationmethods are available.  Clinical consideration and 
professional judgment should be applied to any drug of abusetest result, 
particularly when preliminary positive resultsare used.Unconfirmed screening 
results must not be used fornon-medical purposes (e.g., employment testing, 
legaltesting).

 

             URN AMPHETAMINE (test code = AMPHETURN) NEGATIVE     <1000 ng/mL   

             

 

             URN BARBITURATE (test code = BARBITURN) NEGATIVE     <200 ng/mL    

             

 

             URN BENZODIAZEPINE (test code = BENZOURN) NEGATIVE     <200 ng/mL  

               

 

             URN OPIATES (test code = OPIATURN) NEGATIVE     <300 ng/mL         

        

 

             URN PHENCYCLIDINE (PCP) (test code = PHENCURN) NEGATIVE     <25 ng/

mL                  

 

             URN METHADONE (test code = METHAURN) NEGATIVE     <300 ng/mL       

          





DRUGS OF ABUSE SCREEN  15:57:00* 



             Test Item    Value        Reference Range Interpretation Comments

 

             UA PH DIPSTICK (test code = SHAWN) 8.5          5.0-8.0              

      

 

             URN COCAINE (test code = COCAURN)              <300 ng/mL          

       

 

             URN CANNABINOIDS (test code = CANNABURN)              <50 ng/mL    

              

 

             URN AMPHETAMINE (test code = AMPHETURN)              <1000 ng/mL   

             

 

             URN BARBITURATE (test code = BARBITURN)              <200 ng/mL    

             

 

             URN BENZODIAZEPINE (test code = BENZOURN)              <200 ng/mL  

               

 

             URN OPIATES (test code = OPIATURN)              <300 ng/mL         

        

 

             URN PHENCYCLIDINE (PCP) (test code = PHENCURN)              <25 ng/

mL                  

 

             URN METHADONE (test code = METHAURN)              <300 ng/mL       

          





- CT ABD PELVIS W/O [id number] 13:56:00  Name: REYES,JOSEPHINE              
    [facility]                     : 1955 Age/S: 64  / F         
[address]                Unit #: [id number]     Loc:               
[place]  [address]              Phys: Karen Reyez MD                      
                           Acct: [id number]  Dis Date:               Status: 
ADM IN                                  PHONE #: [phone]     Exam Date: 
2020  1346                     FAX #: [phone]      Reason: Abdominal 
pain ,nausea and vomiting                 EXAMS:                                
              CPT CODE:      [id number] CT ABD PELVIS W/O CONT                   
 [address]                    HISTORY: Abdominal pain with nausea and vomiting.     
         COMPARISON: 2020.               Location: [facility].             
 CT abdomen and pelvis: Stone protocol.  Automated exposure control.            
  CT of abdomen:               The lung bases are clear.               Hepatic 
parenchyma demonstrating diffuse fatty infiltration.  No       parenchymal mass 
or architectural distortion.  The liver measured 16       cm in length.  
Moderately distended gallbladder is without radiopaque       stones.            
  Unremarkable spleen.  The stomach distended incompletely with markedly       
thickened distal esophagus which is unchanged from prior exam.  Direct       
visualization to evaluate for esophagitis.               Noncontrast pancreas is
normal.  Adrenals are normal with hyperplasia       on the left.               
Kidneys are free from hydroureteronephrosis.  Chronic perinephric fat       
stranding.  No calyceal stones.               No pathologic adenopathy.  Mild 
atherosclerotic change of the       abdominal and pelvic vasculature.           
   No bowel obstruction or diverticulitis or enteritis.  Circumferential       
wall thickening of the right colon suggestive of colitis.               CT 
PELVIS:               Marked circumferential wall thickening of the proximal 
right colon       suggestive of acute colitis.  Appendix is normal.  Small bowel
loops       are normal.  Sigmoid diverticulosis without diverticulitis.         
     Unremarkable well-distended urinary bladder.  Phleboliths.        
Unremarkable uterus.  Right ovary is not seen.  The left ovary       demonstrati
ng cyst which measured 1.9 cm with average Hounsfield unit       measurement of 
16.  This is unusual for patient's age.  No free fluid       or free air.  No pe
lvic pathologic adenopathy.             PAGE  1                       Signed Rep
ort                    (CONTINUED)   Name: REYES,JOSEPHINE                   [facility]                     : 1955 Age/S: 64  / F         [address]                Unit #: [id number]     Loc:               [facility]  
[address]              Phys: Karen Reyez MD                                     
            Acct: [id number]  Dis Date:               Status: ADM IN          
                       PHONE #: [phone]     Exam Date: 2020  1346    
                FAX #: [phone]      Reason: Abdominal pain ,nausea and v
omiting                 EXAMS:                                               CPT
CODE:      [id number] CT ABD PELVIS W/O CONT                     [address]66           
   <Continued>        Subcutaneous tissues and the musculature are normal in 
appearance.  No       lytic or blastic lesions are visible within the bony 
skeleton.  DJD.                 IMPRESSION:                   Normal appendix.  
No bowel obstruction.  Moderate circumferential wall         thickening of the 
right colon especially in the proximal right colon         suggestive of acute 
colitis.  Sigmoid diverticulosis without         diverticulitis.  No free fluid 
or free air.  Thickened distal         esophagus.  Correlate for esophagitis and
direct visualization as         well.          ** Electronically Signed by DANNA No on 2020 at 1356 **                      Reported and signed 
by: Star No M.D.                           CC: Jonathan Larson MD; 
Karen Reyez MD                                                              
                                    Technologist:Jacobo Roberts RT(R); Pb 
CTDI:        DLP:        Trnscb Date/Time: 2020 (6510) t.SDR.TH4          
             Orig Print D/T: S: 2020 (9890)      PAGE  2                  
    Signed Report                               URINALYSIS [id number] 
12:05:00* 



             Test Item    Value        Reference Range Interpretation Comments

 

             UA COLOR (test code = COLU) Light-Yellow YELLOW                    

 

 

             UA APPEARANCE (test code = APPU) CLEAR        CLEAR                

      

 

             UA GLUCOSE DIPSTICK (test code = DGLUU) 30 (Trace) mg/dL NEGATIVE  

   A             

 

             UA BILIRUBIN DIPSTICK (test code = BILU) NEGATIVE mg/dL NEGATIVE   

                

 

             UA KETONE DIPSTICK (test code = KETU) 20 (1+) mg/dL NEGATIVE     A 

            

 

             UA SPECIFIC GRAVITY (test code = SGU) 1.007        1.001-1.035     

           

 

             UA BLOOD DIPSTICK (test code = CLARE) 0.06 mg/dL (1+) mg/dL NEGATIVE 

    A             

 

             UA PH DIPSTICK (test code = SHAWN) 8.5          5.0-8.0              

      

 

             UA PROTEIN DIPSTICK (test code = PROU) 30 (1+) mg/dL NEGATIVE     A

             

 

             UA UROBILINIOGEN DIPSTICK (test code = URO) Normal mg/dL NEGATIVE  

                 

 

             UA NITRITE DIPSTICK (test code = PRISCA) NEGATIVE     NEGATIVE       

            

 

             UA LEUKOCYTE ESTERASE W REFLEX (test code = LEUUR) NEGATIVE Tree/uL 

NEGATIVE                   

 

             UA WBC (test code = WBCU) 0-5 per HPF  0-5                        

 

             UA RBC (test code = RBCU) 0-2 #/HPF    0-5                        

 

             UA EPITHELIAL CELLS (test code = EPIU) FEW per HPF  FEW            

            

 

             UA BACTERIA (test code = BACU) FEW #/HPF    NONE         A         

    

 

             UA MUCUS (test code = MUCU) FEW #/LPF    FEW                       

 





Urine Source? Clean CatchURINALYSIS [id number] 11:53:00* 



             Test Item    Value        Reference Range Interpretation Comments

 

             UA COLOR (test code = COLU) Light-Yellow YELLOW                    

 

 

             UA APPEARANCE (test code = APPU) CLEAR        CLEAR                

      

 

             UA GLUCOSE DIPSTICK (test code = DGLUU) 30 (Trace) mg/dL NEGATIVE  

   A             

 

             UA BILIRUBIN DIPSTICK (test code = BILU) NEGATIVE mg/dL NEGATIVE   

                

 

             UA KETONE DIPSTICK (test code = KETU) 20 (1+) mg/dL NEGATIVE     A 

            

 

             UA SPECIFIC GRAVITY (test code = SGU) 1.007        1.001-1.035     

           

 

             UA BLOOD DIPSTICK (test code = CLARE) 0.06 mg/dL (1+) mg/dL NEGATIVE 

    A             

 

             UA PH DIPSTICK (test code = SHAWN) 8.5          5.0-8.0              

      

 

             UA PROTEIN DIPSTICK (test code = PROU) 30 (1+) mg/dL NEGATIVE     A

             

 

             UA UROBILINIOGEN DIPSTICK (test code = URO) Normal mg/dL NEGATIVE  

                 

 

             UA NITRITE DIPSTICK (test code = PRISCA) NEGATIVE     NEGATIVE       

            

 

             UA LEUKOCYTE ESTERASE W REFLEX (test code = LEUUR) NEGATIVE Tree/uL 

NEGATIVE                   

 

             UA WBC (test code = WBCU)  per HPF     0-5                        

 

             UA RBC (test code = RBCU)  per HPF     0-5                        

 

             UA EPITHELIAL CELLS (test code = EPIU)  per HPF     Few            

            

 

             UA BACTERIA (test code = BACU)  per HPF     NONE                   

    





Urine Source? Clean CatchBASIC METABOLIC [id number] 10:32:00* 



             Test Item    Value        Reference Range Interpretation Comments

 

             SODIUM (test code = NA) 127 mmol/L   136-145      L             

 

             POTASSIUM (test code = K) 3.2 mmol/L   3.5-5.1      L             

 

             CHLORIDE (test code = CL) 73.0 mmol/L         L             

 

             CARBON DIOXIDE (test code = CO2) 33.0 mmol/L  21-32        H       

      

 

             ANION GAP (test code = GAP) 24.2         10-20        H            

 

 

             GLUCOSE (test code = GLU) 230 mg/dL           H             

 

             BLOOD UREA NITROGEN (test code = BUN) 9 mg/dL      7-18         N  

           

 

             GLOMERULAR FILTRATION RATE (test code = GFR) 41 mL/min    >=60     

                 Estimated GFR by 

using Modified MDRD formula.Chronic kidney disease is defined as either kidney 
damageor GFR <60 mL/min/1.73 m2 for >3 months.

 

             CREATININE (test code = CREAT) 1.30 mg/dL   0.55-1.02    H         

   **Note change in reference

range due to change in reagent.**

 

             BUN/CREATININE RATIO (test code = BUN/CREA) 6.9          10-20     

   L             

 

             CALCIUM (test code = CA) 10.6 mg/dL   8.5-10.1     H             





HEPATIC FUNCTION [id number] 10:32:00* 



             Test Item    Value        Reference Range Interpretation Comments

 

             TOTAL PROTEIN (test code = PROT) 10.5 gram/dL 6.4-8.2      H       

      

 

             ALBUMIN (test code = ALB) 4.3 g/dL     3.4-5.0      N             

 

             GLOBULIN (test code = GLOB) 6.2 gram/dL  2.7-4.2      H            

 

 

             ALBUMIN/GLOBULIN RATIO (test code = A/G) 0.7          0.75-1.50    

L             

 

             BILIRUBIN TOTAL (test code = BILT) 2.30 mg/dL   0.0-1.0      H     

        

 

             BILIRUBIN DIRECT (test code = BILD) 0.24 mg/dL   0.0-0.20     H    

         

 

             SGOT/AST (test code = AST) 87 IUnit/L   15-37        H             

 

             SGPT/ALT (test code = ALT) 53 IUnit/L   12-78        N             

 

             ALKALINE PHOSPHATASE TOTAL (test code = ALKP) 137 IUnit/L    

     H            **Note change

in reference range due to change in reagent.**





[id number] 10:32:00* 



             Test Item    Value        Reference Range Interpretation Comments

 

             LIPASE (test code = LIP) 152 U/L      73.0-393.0   N             





[id number] 10:32:00* 



             Test Item    Value        Reference Range Interpretation Comments

 

             TROPONIN-I (test code = TROPI) 0.028 ng/mL  0-0.045      N         

    





BASIC METABOLIC [id number] 10:30:00* 



             Test Item    Value        Reference Range Interpretation Comments

 

             SODIUM (test code = NA) 127 mmol/L   136-145      L             

 

             POTASSIUM (test code = K) 3.2 mmol/L   3.5-5.1      L             

 

             CHLORIDE (test code = CL) 73.0 mmol/L         L             

 

             CARBON DIOXIDE (test code = CO2)  mmol/L      21-32                

      

 

             ANION GAP (test code = GAP)              10-20                     

 

 

             GLUCOSE (test code = GLU)  mg/dL                            

 

             BLOOD UREA NITROGEN (test code = BUN)  mg/dL       7-18            

           

 

             GLOMERULAR FILTRATION RATE (test code = GFR)  mL/min      >=60     

                  

 

             CREATININE (test code = CREAT)  mg/dL       0.55-1.02              

    

 

             BUN/CREATININE RATIO (test code = BUN/CREA)              10-20     

                 

 

             CALCIUM (test code = CA)  mg/dL       8.5-10.1                   





HEPATIC FUNCTION [id number] 10:30:00* 



             Test Item    Value        Reference Range Interpretation Comments

 

             TOTAL PROTEIN (test code = PROT)  gram/dL     6.4-8.2              

      

 

             ALBUMIN (test code = ALB)  g/dL        3.4-5.0                    

 

             GLOBULIN (test code = GLOB)  gram/dL     2.7-4.2                   

 

 

             ALBUMIN/GLOBULIN RATIO (test code = A/G)              0.75-1.50    

              

 

             BILIRUBIN TOTAL (test code = BILT)  mg/dL       0.0-1.0            

        

 

             BILIRUBIN DIRECT (test code = BILD)  mg/dL       0.0-0.20          

         

 

             SGOT/AST (test code = AST)  IUnit/L     15-37                      

 

             SGPT/ALT (test code = ALT)  IUnit/L     12-78                      

 

             ALKALINE PHOSPHATASE TOTAL (test code = ALKP)  IUnit/L       

                   





[id number] 10:30:00* 



             Test Item    Value        Reference Range Interpretation Comments

 

             LIPASE (test code = LIP)  U/L         73.0-393.0                 





[id number] 10:30:00* 



             Test Item    Value        Reference Range Interpretation Comments

 

             TROPONIN-I (test code = TROPI)  ng/mL       0-0.045                

    





CBC W/O [id number] 10:22:00* 



             Test Item    Value        Reference Range Interpretation Comments

 

             WHITE BLOOD CELL (test code = WBC) 13.3 K/mm3   4.5-12.5     H     

        

 

             RED BLOOD CELL (test code = RBC) 4.84 mill/mm3 3.7-5.2      N      

       

 

             HEMOGLOBIN (test code = HGB) 15.9 gram/dL 11.5-15.5    H           

  

 

             HEMATOCRIT (test code = HCT) 45.1 %       36.0-46.0    N           

  

 

             MEAN CELL VOLUME (test code = MCV) 93.4 fL      80-98        N     

        

 

             MEAN CELL HGB (test code = MCH) 32.9 picogram 27.0-33.0    N       

      

 

             MEAN CELL HGB CONCETRATION (test code = MCHC) 35.2 gram/dL 33.0-36.

0    N             

 

             RED CELL DISTRIBUTION WIDTH (test code = RDW) 12.7 %       11.6-16.

2    N             

 

             PLATELET COUNT (test code = PLT) 119 K/mm3    150-450      L       

      

 

             MEAN PLATELET VOLUME (test code = MPV) 10.3 fL      6.7-11.0     N 

            





CBC W/O [id number] 10:15:00* 



             Test Item    Value        Reference Range Interpretation Comments

 

             WHITE BLOOD CELL (test code = WBC)  K/mm3       4.5-12.5           

        

 

             RED BLOOD CELL (test code = RBC)  mill/mm3    3.7-5.2              

      

 

             HEMOGLOBIN (test code = HGB)  gram/dL     11.5-15.5                

  

 

             HEMATOCRIT (test code = HCT) 45.1 %       36.0-46.0    N           

  

 

             MEAN CELL VOLUME (test code = MCV)  fL          80-98              

        

 

             MEAN CELL HGB (test code = MCH)  picogram    27.0-33.0             

     

 

             MEAN CELL HGB CONCETRATION (test code = MCHC)  gram/dL     33.0-36.

0                  

 

             RED CELL DISTRIBUTION WIDTH (test code = RDW)  %           11.6-16.

2                  

 

             PLATELET COUNT (test code = PLT)  K/mm3       150-450              

      

 

             MEAN PLATELET VOLUME (test code = MPV)  fL          6.7-11.0       

            





CBC W/AUTO [id number] 03:49:00* 



             Test Item    Value        Reference Range Interpretation Comments

 

             WHITE BLOOD CELL (test code = WBC) 6.7 K/mm3    4.5-12.5     N     

        

 

             RED BLOOD CELL (test code = RBC) 3.77 mill/mm3 3.7-5.2      N      

       

 

             HEMOGLOBIN (test code = HGB) 12.3 gram/dL 11.5-15.5    N           

  

 

             HEMATOCRIT (test code = HCT) 37.3 %       36.0-46.0    N           

  

 

             MEAN CELL VOLUME (test code = MCV) 98.9 fL      80-98        H     

        

 

             MEAN CELL HGB (test code = MCH) 32.6 picogram 27.0-33.0    N       

      

 

             MEAN CELL HGB CONCETRATION (test code = MCHC) 33.0 gram/dL 33.0-36.

0    N             

 

             RED CELL DISTRIBUTION WIDTH (test code = RDW) 12.8 %       11.6-16.

2    N             

 

             RED CELL DISTRIBUTION WIDTH SD (test code = RDW-SD) 46.6 fL      37

.0-51.0    N             

 

             PLATELET COUNT (test code = PLT) 64 K/mm3     150-450      L       

      

 

             MEAN PLATELET VOLUME (test code = MPV) 12.1 fL      6.7-11.0     H 

            

 

             NEUTROPHIL % (test code = NT%) 46.6 %       39.0-69.0    N         

    

 

             IMMATURE GRANULOCYTE % (test code = IG%) 0.3 %        0.0-5.0      

N             

 

             LYMPHOCYTE % (test code = LY%) 36.9 %       25.0-55.0    N         

    

 

             MONOCYTE % (test code = MO%) 15.5 %       0.0-10.0     H           

  

 

             EOSINOPHIL % (test code = EO%) 0.6 %        0.0-5.0      N         

    

 

             BASOPHIL % (test code = BA%) 0.1 %        0.0-1.0      N           

  

 

             NUCLEATED RBC % (test code = NRBC%) 0.0 %        0-0          N    

         

 

             NEUTROPHIL # (test code = NT#) 3.13 K/mm3   1.8-7.7      N         

    

 

             IMMATURE GRANULOCYTE # (test code = IG#) 0.02 x10 3/uL 0-0.03      

 N             

 

             LYMPHOCYTE # (test code = LY#) 2.48 K/mm3   1.0-5.0      N         

    

 

             MONOCYTE # (test code = MO#) 1.04 K/mm3   0-0.8        H           

  

 

             EOSINOPHIL # (test code = EO#) 0.04 K/mm3   0.0-0.5      N         

    

 

             BASOPHIL # (test code = BA#) 0.01 K/mm3   0.0-0.2      N           

  

 

             NUCLEATED RBC # (test code = NRBC#) 0.00 K/mm3   0.0-0.1      N    

         

 

             MANUAL DIFF REQUIRED (test code = MDIFF) NO, ONLY SCAN NEEDED      

                      





DIFFERENTIAL [id number] 03:49:00* 



             Test Item    Value        Reference Range Interpretation Comments

 

             STAIN ACCEPTABILITY (test code = STN ACCEPTABLE) STAIN ACCEPTABLE  

                          

 

             MORPHOLOGY COMMENT (test code = MOC) NORMAL                        

          

 

             PLATELET ESTIMATE (test code = PLTEST) DECREASED                   

            

 

             PLATELET MORPHOLOGY (test code = PLTMORPH) NORMAL                  

                





BASIC METABOLIC [id number] 03:32:00* 



             Test Item    Value        Reference Range Interpretation Comments

 

             SODIUM (test code = NA) 137 mmol/L   136-145      N             

 

             POTASSIUM (test code = K) 3.7 mmol/L   3.5-5.1      N             

 

             CHLORIDE (test code = CL) 102.0 mmol/L        N             

 

             CARBON DIOXIDE (test code = CO2) 30.0 mmol/L  21-32        N       

      

 

             ANION GAP (test code = GAP) 8.7          10-20        L            

 

 

             GLUCOSE (test code = GLU) 100 mg/dL           N             

 

             BLOOD UREA NITROGEN (test code = BUN) 8 mg/dL      7-18         N  

           

 

             GLOMERULAR FILTRATION RATE (test code = GFR) > 60 mL/min  >=60     

                 Estimated GFR by

using Modified MDRD formula.Chronic kidney disease is defined as either kidney 
damageor GFR <60 mL/min/1.73 m2 for >3 months.

 

             CREATININE (test code = CREAT) 0.70 mg/dL   0.55-1.02    N         

   **Note change in reference

range due to change in reagent.**

 

             BUN/CREATININE RATIO (test code = BUN/CREA) 11.4         10-20     

   N             

 

             CALCIUM (test code = CA) 8.2 mg/dL    8.5-10.1     L             





BASIC METABOLIC [id number] 03:28:00* 



             Test Item    Value        Reference Range Interpretation Comments

 

             SODIUM (test code = NA) 137 mmol/L   136-145      N             

 

             POTASSIUM (test code = K) 3.7 mmol/L   3.5-5.1      N             

 

             CHLORIDE (test code = CL) 102.0 mmol/L        N             

 

             CARBON DIOXIDE (test code = CO2)  mmol/L      21-32                

      

 

             ANION GAP (test code = GAP)              10-20                     

 

 

             GLUCOSE (test code = GLU)  mg/dL                            

 

             BLOOD UREA NITROGEN (test code = BUN)  mg/dL       7-18            

           

 

             GLOMERULAR FILTRATION RATE (test code = GFR)  mL/min      >=60     

                  

 

             CREATININE (test code = CREAT)  mg/dL       0.55-1.02              

    

 

             BUN/CREATININE RATIO (test code = BUN/CREA)              10-20     

                 

 

             CALCIUM (test code = CA) 8.2 mg/dL    8.5-10.1     L             





BASIC METABOLIC [id number] 03:26:00* 



             Test Item    Value        Reference Range Interpretation Comments

 

             SODIUM (test code = NA) 137 mmol/L   136-145      N             

 

             POTASSIUM (test code = K) 3.7 mmol/L   3.5-5.1      N             

 

             CHLORIDE (test code = CL) 102.0 mmol/L        N             

 

             CARBON DIOXIDE (test code = CO2)  mmol/L      21-32                

      

 

             ANION GAP (test code = GAP)              10-20                     

 

 

             GLUCOSE (test code = GLU)  mg/dL                            

 

             BLOOD UREA NITROGEN (test code = BUN)  mg/dL       7-18            

           

 

             GLOMERULAR FILTRATION RATE (test code = GFR)  mL/min      >=60     

                  

 

             CREATININE (test code = CREAT)  mg/dL       0.55-1.02              

    

 

             BUN/CREATININE RATIO (test code = BUN/CREA)              10-20     

                 

 

             CALCIUM (test code = CA)  mg/dL       8.5-10.1                   





CBC W/AUTO [id number] 03:24:00* 



             Test Item    Value        Reference Range Interpretation Comments

 

             WHITE BLOOD CELL (test code = WBC) 6.7 K/mm3    4.5-12.5     N     

        

 

             RED BLOOD CELL (test code = RBC) 3.77 mill/mm3 3.7-5.2      N      

       

 

             HEMOGLOBIN (test code = HGB) 12.3 gram/dL 11.5-15.5    N           

  

 

             HEMATOCRIT (test code = HCT) 37.3 %       36.0-46.0    N           

  

 

             MEAN CELL VOLUME (test code = MCV) 98.9 fL      80-98        H     

        

 

             MEAN CELL HGB (test code = MCH) 32.6 picogram 27.0-33.0    N       

      

 

             MEAN CELL HGB CONCETRATION (test code = MCHC) 33.0 gram/dL 33.0-36.

0    N             

 

             RED CELL DISTRIBUTION WIDTH (test code = RDW) 12.8 %       11.6-16.

2    N             

 

             RED CELL DISTRIBUTION WIDTH SD (test code = RDW-SD) 46.6 fL      37

.0-51.0    N             

 

             PLATELET COUNT (test code = PLT) 64 K/mm3     150-450      L       

      

 

             MEAN PLATELET VOLUME (test code = MPV) 12.1 fL      6.7-11.0     H 

            

 

             NEUTROPHIL % (test code = NT%) 46.6 %       39.0-69.0    N         

    

 

             IMMATURE GRANULOCYTE % (test code = IG%) 0.3 %        0.0-5.0      

N             

 

             LYMPHOCYTE % (test code = LY%) 36.9 %       25.0-55.0    N         

    

 

             MONOCYTE % (test code = MO%) 15.5 %       0.0-10.0     H           

  

 

             EOSINOPHIL % (test code = EO%) 0.6 %        0.0-5.0      N         

    

 

             BASOPHIL % (test code = BA%) 0.1 %        0.0-1.0      N           

  

 

             NUCLEATED RBC % (test code = NRBC%) 0.0 %        0-0          N    

         

 

             NEUTROPHIL # (test code = NT#) 3.13 K/mm3   1.8-7.7      N         

    

 

             IMMATURE GRANULOCYTE # (test code = IG#) 0.02 x10 3/uL 0-0.03      

 N             

 

             LYMPHOCYTE # (test code = LY#) 2.48 K/mm3   1.0-5.0      N         

    

 

             MONOCYTE # (test code = MO#) 1.04 K/mm3   0-0.8        H           

  

 

             EOSINOPHIL # (test code = EO#) 0.04 K/mm3   0.0-0.5      N         

    

 

             BASOPHIL # (test code = BA#) 0.01 K/mm3   0.0-0.2      N           

  

 

             NUCLEATED RBC # (test code = NRBC#) 0.00 K/mm3   0.0-0.1      N    

         

 

             MANUAL DIFF REQUIRED (test code = MDIFF) NO, ONLY SCAN NEEDED      

                      





DIFFERENTIAL [id number] 03:24:00* 



             Test Item    Value        Reference Range Interpretation Comments

 

             STAIN ACCEPTABILITY (test code = STN ACCEPTABLE)                   

                      

 

             CABOT RINGS (test code = CAB)                                      

   

 

             MORPHOLOGY COMMENT (test code = MOC)                               

          

 

             PLATELET ESTIMATE (test code = PLTEST)                             

            

 

             PLATELET MORPHOLOGY (test code = PLTMORPH)                         

                





CBC W/AUTO [id number] 03:24:00* 



             Test Item    Value        Reference Range Interpretation Comments

 

             WHITE BLOOD CELL (test code = WBC) 6.7 K/mm3    4.5-12.5     N     

        

 

             RED BLOOD CELL (test code = RBC) 3.77 mill/mm3 3.7-5.2      N      

       

 

             HEMOGLOBIN (test code = HGB) 12.3 gram/dL 11.5-15.5    N           

  

 

             HEMATOCRIT (test code = HCT) 37.3 %       36.0-46.0    N           

  

 

             MEAN CELL VOLUME (test code = MCV) 98.9 fL      80-98        H     

        

 

             MEAN CELL HGB (test code = MCH) 32.6 picogram 27.0-33.0    N       

      

 

             MEAN CELL HGB CONCETRATION (test code = MCHC) 33.0 gram/dL 33.0-36.

0    N             

 

             RED CELL DISTRIBUTION WIDTH (test code = RDW) 12.8 %       11.6-16.

2    N             

 

             RED CELL DISTRIBUTION WIDTH SD (test code = RDW-SD) 46.6 fL      37

.0-51.0    N             

 

             PLATELET COUNT (test code = PLT) 64 K/mm3     150-450      L       

      

 

             MEAN PLATELET VOLUME (test code = MPV) 12.1 fL      6.7-11.0     H 

            

 

             NEUTROPHIL % (test code = NT%) 46.6 %       39.0-69.0    N         

    

 

             IMMATURE GRANULOCYTE % (test code = IG%) 0.3 %        0.0-5.0      

N             

 

             LYMPHOCYTE % (test code = LY%) 36.9 %       25.0-55.0    N         

    

 

             MONOCYTE % (test code = MO%) 15.5 %       0.0-10.0     H           

  

 

             EOSINOPHIL % (test code = EO%) 0.6 %        0.0-5.0      N         

    

 

             BASOPHIL % (test code = BA%) 0.1 %        0.0-1.0      N           

  

 

             NUCLEATED RBC % (test code = NRBC%) 0.0 %        0-0          N    

         

 

             NEUTROPHIL # (test code = NT#) 3.13 K/mm3   1.8-7.7      N         

    

 

             IMMATURE GRANULOCYTE # (test code = IG#) 0.02 x10 3/uL 0-0.03      

 N             

 

             LYMPHOCYTE # (test code = LY#) 2.48 K/mm3   1.0-5.0      N         

    

 

             MONOCYTE # (test code = MO#) 1.04 K/mm3   0-0.8        H           

  

 

             EOSINOPHIL # (test code = EO#) 0.04 K/mm3   0.0-0.5      N         

    

 

             BASOPHIL # (test code = BA#) 0.01 K/mm3   0.0-0.2      N           

  

 

             NUCLEATED RBC # (test code = NRBC#) 0.00 K/mm3   0.0-0.1      N    

         

 

             MANUAL DIFF REQUIRED (test code = MDIFF) NO, ONLY SCAN NEEDED      

                      





DIFFERENTIAL [id number] 03:24:00* 



             Test Item    Value        Reference Range Interpretation Comments

 

             STAIN ACCEPTABILITY (test code = STN ACCEPTABLE)                   

                      

 

             CABOT RINGS (test code = CAB)                                      

   

 

             MORPHOLOGY COMMENT (test code = MOC)                               

          

 

             PLATELET ESTIMATE (test code = PLTEST)                             

            

 

             PLATELET MORPHOLOGY (test code = PLTMORPH)                         

                





CBC W/AUTO [id number] 03:24:00* 



             Test Item    Value        Reference Range Interpretation Comments

 

             WHITE BLOOD CELL (test code = WBC) 6.7 K/mm3    4.5-12.5     N     

        

 

             RED BLOOD CELL (test code = RBC) 3.77 mill/mm3 3.7-5.2      N      

       

 

             HEMOGLOBIN (test code = HGB) 12.3 gram/dL 11.5-15.5    N           

  

 

             HEMATOCRIT (test code = HCT) 37.3 %       36.0-46.0    N           

  

 

             MEAN CELL VOLUME (test code = MCV) 98.9 fL      80-98        H     

        

 

             MEAN CELL HGB (test code = MCH) 32.6 picogram 27.0-33.0    N       

      

 

             MEAN CELL HGB CONCETRATION (test code = MCHC) 33.0 gram/dL 33.0-36.

0    N             

 

             RED CELL DISTRIBUTION WIDTH (test code = RDW) 12.8 %       11.6-16.

2    N             

 

             RED CELL DISTRIBUTION WIDTH SD (test code = RDW-SD) 46.6 fL      37

.0-51.0    N             

 

             PLATELET COUNT (test code = PLT) 64 K/mm3     150-450      L       

      

 

             MEAN PLATELET VOLUME (test code = MPV) 12.1 fL      6.7-11.0     H 

            

 

             NEUTROPHIL % (test code = NT%) 46.6 %       39.0-69.0    N         

    

 

             IMMATURE GRANULOCYTE % (test code = IG%) 0.3 %        0.0-5.0      

N             

 

             LYMPHOCYTE % (test code = LY%) 36.9 %       25.0-55.0    N         

    

 

             MONOCYTE % (test code = MO%) 15.5 %       0.0-10.0     H           

  

 

             EOSINOPHIL % (test code = EO%) 0.6 %        0.0-5.0      N         

    

 

             BASOPHIL % (test code = BA%) 0.1 %        0.0-1.0      N           

  

 

             NUCLEATED RBC % (test code = NRBC%) 0.0 %        0-0          N    

         

 

             NEUTROPHIL # (test code = NT#) 3.13 K/mm3   1.8-7.7      N         

    

 

             IMMATURE GRANULOCYTE # (test code = IG#) 0.02 x10 3/uL 0-0.03      

 N             

 

             LYMPHOCYTE # (test code = LY#) 2.48 K/mm3   1.0-5.0      N         

    

 

             MONOCYTE # (test code = MO#) 1.04 K/mm3   0-0.8        H           

  

 

             EOSINOPHIL # (test code = EO#) 0.04 K/mm3   0.0-0.5      N         

    

 

             BASOPHIL # (test code = BA#) 0.01 K/mm3   0.0-0.2      N           

  

 

             NUCLEATED RBC # (test code = NRBC#) 0.00 K/mm3   0.0-0.1      N    

         

 

             MANUAL DIFF REQUIRED (test code = MDIFF) NO, ONLY SCAN NEEDED      

                      





DIFFERENTIAL [id number] 03:24:00* 



             Test Item    Value        Reference Range Interpretation Comments

 

             STAIN ACCEPTABILITY (test code = STN ACCEPTABLE)                   

                      

 

             MORPHOLOGY COMMENT (test code = MOC)                               

          

 

             PLATELET ESTIMATE (test code = PLTEST)                             

            

 

             PLATELET MORPHOLOGY (test code = PLTMORPH)                         

                





CBC W/AUTO [id number] 03:24:00* 



             Test Item    Value        Reference Range Interpretation Comments

 

             WHITE BLOOD CELL (test code = WBC) 6.7 K/mm3    4.5-12.5     N     

        

 

             RED BLOOD CELL (test code = RBC) 3.77 mill/mm3 3.7-5.2      N      

       

 

             HEMOGLOBIN (test code = HGB) 12.3 gram/dL 11.5-15.5    N           

  

 

             HEMATOCRIT (test code = HCT) 37.3 %       36.0-46.0    N           

  

 

             MEAN CELL VOLUME (test code = MCV) 98.9 fL      80-98        H     

        

 

             MEAN CELL HGB (test code = MCH) 32.6 picogram 27.0-33.0    N       

      

 

             MEAN CELL HGB CONCETRATION (test code = MCHC) 33.0 gram/dL 33.0-36.

0    N             

 

             RED CELL DISTRIBUTION WIDTH (test code = RDW) 12.8 %       11.6-16.

2    N             

 

             RED CELL DISTRIBUTION WIDTH SD (test code = RDW-SD) 46.6 fL      37

.0-51.0    N             

 

             PLATELET COUNT (test code = PLT) 64 K/mm3     150-450      L       

      

 

             MEAN PLATELET VOLUME (test code = MPV) 12.1 fL      6.7-11.0     H 

            

 

             NEUTROPHIL % (test code = NT%) 46.6 %       39.0-69.0    N         

    

 

             IMMATURE GRANULOCYTE % (test code = IG%) 0.3 %        0.0-5.0      

N             

 

             LYMPHOCYTE % (test code = LY%) 36.9 %       25.0-55.0    N         

    

 

             MONOCYTE % (test code = MO%) 15.5 %       0.0-10.0     H           

  

 

             EOSINOPHIL % (test code = EO%) 0.6 %        0.0-5.0      N         

    

 

             BASOPHIL % (test code = BA%) 0.1 %        0.0-1.0      N           

  

 

             NUCLEATED RBC % (test code = NRBC%) 0.0 %        0-0          N    

         

 

             NEUTROPHIL # (test code = NT#) 3.13 K/mm3   1.8-7.7      N         

    

 

             IMMATURE GRANULOCYTE # (test code = IG#) 0.02 x10 3/uL 0-0.03      

 N             

 

             LYMPHOCYTE # (test code = LY#) 2.48 K/mm3   1.0-5.0      N         

    

 

             MONOCYTE # (test code = MO#) 1.04 K/mm3   0-0.8        H           

  

 

             EOSINOPHIL # (test code = EO#) 0.04 K/mm3   0.0-0.5      N         

    

 

             BASOPHIL # (test code = BA#) 0.01 K/mm3   0.0-0.2      N           

  

 

             NUCLEATED RBC # (test code = NRBC#) 0.00 K/mm3   0.0-0.1      N    

         

 

             MANUAL DIFF REQUIRED (test code = MDIFF) NO, ONLY SCAN NEEDED      

                      





DIFFERENTIAL [id number] 03:24:00* 



             Test Item    Value        Reference Range Interpretation Comments

 

             STAIN ACCEPTABILITY (test code = STN ACCEPTABLE)                   

                      

 

             CABOT RINGS (test code = CAB)                                      

   

 

             MORPHOLOGY COMMENT (test code = MOC)                               

          

 

             PLATELET ESTIMATE (test code = PLTEST)                             

            

 

             PLATELET MORPHOLOGY (test code = PLTMORPH)                         

                





CBC W/AUTO [id number] 03:16:00* 



             Test Item    Value        Reference Range Interpretation Comments

 

             WHITE BLOOD CELL (test code = WBC)  K/mm3       4.5-12.5           

        

 

             RED BLOOD CELL (test code = RBC)  mill/mm3    3.7-5.2              

      

 

             HEMOGLOBIN (test code = HGB) 12.3 gram/dL 11.5-15.5    N           

  

 

             HEMATOCRIT (test code = HCT) 37.3 %       36.0-46.0    N           

  

 

             MEAN CELL VOLUME (test code = MCV)  fL          80-98              

        

 

             MEAN CELL HGB (test code = MCH)  picogram    27.0-33.0             

     

 

             MEAN CELL HGB CONCETRATION (test code = MCHC)  gram/dL     33.0-36.

0                  

 

             RED CELL DISTRIBUTION WIDTH (test code = RDW)  %           11.6-16.

2                  

 

             RED CELL DISTRIBUTION WIDTH SD (test code = RDW-SD)  fL          37

.0-51.0                  

 

             PLATELET COUNT (test code = PLT)  K/mm3       150-450              

      

 

             MEAN PLATELET VOLUME (test code = MPV)  fL          6.7-11.0       

            

 

             NEUTROPHIL % (test code = NT%)  %           39.0-69.0              

    

 

             IMMATURE GRANULOCYTE % (test code = IG%)  %           0.0-5.0      

              

 

             LYMPHOCYTE % (test code = LY%)  %           25.0-55.0              

    

 

             MONOCYTE % (test code = MO%)  %           0.0-10.0                 

  

 

             EOSINOPHIL % (test code = EO%)  %           0.0-5.0                

    

 

             BASOPHIL % (test code = BA%)  %           0.0-1.0                  

  

 

             NEUTROPHIL # (test code = NT#)  K/mm3       1.8-7.7                

    

 

             LYMPHOCYTE # (test code = LY#)  K/mm3       1.0-5.0                

    

 

             MONOCYTE # (test code = MO#)  K/mm3       0-0.8                    

  

 

             EOSINOPHIL # (test code = EO#)  K/mm3       0.0-0.5                

    

 

             BASOPHIL # (test code = BA#)  K/mm3       0.0-0.2                  

  





- HEPA IMAG INCL GB W [id number] 08:48:00 FAX:         Agusto Rae 
[phone]    Tensed: B   St: ADM----------
---------------------------------------------------------------------  Name:   OVIDIO SALAZAR                  [facility]                     : [birth year]  Age/S: 64/F           [address]                Unit #: [id number]    
 Loc: [place]  [address]              Phys: Agusto Rae MD
                                              Acct: [id number] Dis Date:      
        Status: ADM IN                                 PHONE #: [phone]    
Exam Date:     2020     0839                   FAX #: [phone]     
Reason: ruq abd pain                                       EXAMS:               
                               CPT CODE:      [id number] HEPA IMAG INCL GB W PHA 
                  [id number]                    HISTORY: Right upper quadrant abdo
larisa pain.               COMPARISON: Abdominal ultrasound from previous day.   
           Location: [facility].               HIDA scan: 5.3 mCi of technetium 99m Ch
oletec and 1.3 mcg of CCK.       Sequential images obtained.               Homog
eneous uptake within the liver. Patient into the biliary system       as well as
into the gallbladder and small bowel.               Ejection fraction calculated
to 40% at 13 1/2 minutes. The normal       should be greater than 35%.          
      IMPRESSION:                   Normal ejection fraction of 40% at 13 1/2 
minutes. No evidence for         acute cholecystitis.          ** Electronically
Signed by DANNA No on 2020 at 0848 **                      Repo
rted and signed by: Star No M.D.                     CC: Agusto Rae MD                                                                            
                                      Technologist: Blaire Robledo RT(N)         
                          Trnscrd Date/Time/By: 2020 (0848) : By: Chris.
TH4           Orig Print D/T: S: 2020 (8407)                         PAGE 
1                       Signed Report                               BASIC 
METABOLIC [id number] 03:19:00* 



             Test Item    Value        Reference Range Interpretation Comments

 

             SODIUM (test code = NA) 135 mmol/L   136-145      L             

 

             POTASSIUM (test code = K) 2.6 mmol/L   3.5-5.1      [facility] called to [id number] by 

[email or web address] 20 [id number] results verified and read back by Nurse? Y

 

             CHLORIDE (test code = CL) 99.0 mmol/L         N             

 

             CARBON DIOXIDE (test code = CO2) 30.0 mmol/L  21-32        N       

      

 

             ANION GAP (test code = GAP) 8.6          10-20        L            

 

 

             GLUCOSE (test code = GLU) 97 mg/dL            N             

 

             BLOOD UREA NITROGEN (test code = BUN) 10 mg/dL     7-18         N  

           

 

             GLOMERULAR FILTRATION RATE (test code = GFR) > 60 mL/min  >=60     

                 Estimated GFR by

using Modified MDRD formula.Chronic kidney disease is defined as either kidney 
damageor GFR <60 mL/min/1.73 m2 for >3 months.

 

             CREATININE (test code = CREAT) 0.90 mg/dL   0.55-1.02    N         

   **Note change in reference

range due to change in reagent.**

 

             BUN/CREATININE RATIO (test code = BUN/CREA) 11.1         10-20     

   N             

 

             CALCIUM (test code = CA) 8.4 mg/dL    8.5-10.1     L             





[id number] 03:19:00* 



             Test Item    Value        Reference Range Interpretation Comments

 

             PHOSPHORUS (test code = PHOS) 1.5 mg/dL    2.5-4.9      L          

   





[id number] 03:19:00* 



             Test Item    Value        Reference Range Interpretation Comments

 

             MAGNESIUM (test code = MAG) 2.4 mg/dL    1.8-2.4      N            

 





CBC W/AUTO [id number] 03:02:00* 



             Test Item    Value        Reference Range Interpretation Comments

 

             WHITE BLOOD CELL (test code = WBC) 9.4 K/mm3    4.5-12.5     N     

        

 

             RED BLOOD CELL (test code = RBC) 3.79 mill/mm3 3.7-5.2      N      

       

 

             HEMOGLOBIN (test code = HGB) 12.4 gram/dL 11.5-15.5    N           

  

 

             HEMATOCRIT (test code = HCT) 37.1 %       36.0-46.0    N           

  

 

             MEAN CELL VOLUME (test code = MCV) 97.9 fL      80-98        N     

        

 

             MEAN CELL HGB (test code = MCH) 32.7 picogram 27.0-33.0    N       

      

 

             MEAN CELL HGB CONCETRATION (test code = MCHC) 33.4 gram/dL 33.0-36.

0    N             

 

             RED CELL DISTRIBUTION WIDTH (test code = RDW) 13.1 %       11.6-16.

2    N             

 

             RED CELL DISTRIBUTION WIDTH SD (test code = RDW-SD) 46.7 fL      37

.0-51.0    N             

 

             PLATELET COUNT (test code = PLT) 60 K/mm3     150-450      L       

      

 

             MEAN PLATELET VOLUME (test code = MPV) 12.7 fL      6.7-11.0     H 

            

 

             NEUTROPHIL % (test code = NT%) 70.8 %       39.0-69.0    H         

    

 

             IMMATURE GRANULOCYTE % (test code = IG%) 0.4 %        0.0-5.0      

N             

 

             LYMPHOCYTE % (test code = LY%) 18.5 %       25.0-55.0    L         

    

 

             MONOCYTE % (test code = MO%) 10.2 %       0.0-10.0     H           

  

 

             EOSINOPHIL % (test code = EO%) 0.0 %        0.0-5.0      N         

    

 

             BASOPHIL % (test code = BA%) 0.1 %        0.0-1.0      N           

  

 

             NUCLEATED RBC % (test code = NRBC%) 0.0 %        0-0          N    

         

 

             NEUTROPHIL # (test code = NT#) 6.68 K/mm3   1.8-7.7      N         

    

 

             IMMATURE GRANULOCYTE # (test code = IG#) 0.04 x10 3/uL 0-0.03      

 H             

 

             LYMPHOCYTE # (test code = LY#) 1.74 K/mm3   1.0-5.0      N         

    

 

             MONOCYTE # (test code = MO#) 0.96 K/mm3   0-0.8        H           

  

 

             EOSINOPHIL # (test code = EO#) 0.00 K/mm3   0.0-0.5      N         

    

 

             BASOPHIL # (test code = BA#) 0.01 K/mm3   0.0-0.2      N           

  

 

             NUCLEATED RBC # (test code = NRBC#) 0.00 K/mm3   0.0-0.1      N    

         

 

             MANUAL DIFF REQUIRED (test code = MDIFF) NO, ONLY SCAN NEEDED      

                      





DIFFERENTIAL [id number] 03:02:00* 



             Test Item    Value        Reference Range Interpretation Comments

 

             STAIN ACCEPTABILITY (test code = STN ACCEPTABLE) STAIN ACCEPTABLE  

                          

 

             POLYCHROMASIA (test code = POLC) 1+                                

      

 

             ANISOCYTOSIS (test code = ANISO) 1+                                

      

 

             MORPHOLOGY COMMENT (test code = MOC) TEST NOT PERFORMED            

                

 

             PLATELET ESTIMATE (test code = PLTEST) DECREASED                   

            

 

             PLATELET MORPHOLOGY (test code = PLTMORPH) NORMAL                  

                





CBC W/AUTO [id number] 02:30:00* 



             Test Item    Value        Reference Range Interpretation Comments

 

             WHITE BLOOD CELL (test code = WBC) 9.4 K/mm3    4.5-12.5     N     

        

 

             RED BLOOD CELL (test code = RBC) 3.79 mill/mm3 3.7-5.2      N      

       

 

             HEMOGLOBIN (test code = HGB) 12.4 gram/dL 11.5-15.5    N           

  

 

             HEMATOCRIT (test code = HCT) 37.1 %       36.0-46.0    N           

  

 

             MEAN CELL VOLUME (test code = MCV) 97.9 fL      80-98        N     

        

 

             MEAN CELL HGB (test code = MCH) 32.7 picogram 27.0-33.0    N       

      

 

             MEAN CELL HGB CONCETRATION (test code = MCHC) 33.4 gram/dL 33.0-36.

0    N             

 

             RED CELL DISTRIBUTION WIDTH (test code = RDW) 13.1 %       11.6-16.

2    N             

 

             RED CELL DISTRIBUTION WIDTH SD (test code = RDW-SD) 46.7 fL      37

.0-51.0    N             

 

             PLATELET COUNT (test code = PLT) 60 K/mm3     150-450      L       

      

 

             MEAN PLATELET VOLUME (test code = MPV) 12.7 fL      6.7-11.0     H 

            

 

             NEUTROPHIL % (test code = NT%) 70.8 %       39.0-69.0    H         

    

 

             IMMATURE GRANULOCYTE % (test code = IG%) 0.4 %        0.0-5.0      

N             

 

             LYMPHOCYTE % (test code = LY%) 18.5 %       25.0-55.0    L         

    

 

             MONOCYTE % (test code = MO%) 10.2 %       0.0-10.0     H           

  

 

             EOSINOPHIL % (test code = EO%) 0.0 %        0.0-5.0      N         

    

 

             BASOPHIL % (test code = BA%) 0.1 %        0.0-1.0      N           

  

 

             NUCLEATED RBC % (test code = NRBC%) 0.0 %        0-0          N    

         

 

             NEUTROPHIL # (test code = NT#) 6.68 K/mm3   1.8-7.7      N         

    

 

             IMMATURE GRANULOCYTE # (test code = IG#) 0.04 x10 3/uL 0-0.03      

 H             

 

             LYMPHOCYTE # (test code = LY#) 1.74 K/mm3   1.0-5.0      N         

    

 

             MONOCYTE # (test code = MO#) 0.96 K/mm3   0-0.8        H           

  

 

             EOSINOPHIL # (test code = EO#) 0.00 K/mm3   0.0-0.5      N         

    

 

             BASOPHIL # (test code = BA#) 0.01 K/mm3   0.0-0.2      N           

  

 

             NUCLEATED RBC # (test code = NRBC#) 0.00 K/mm3   0.0-0.1      N    

         

 

             MANUAL DIFF REQUIRED (test code = MDIFF) NO, ONLY SCAN NEEDED      

                      





DIFFERENTIAL [id number] 02:30:00* 



             Test Item    Value        Reference Range Interpretation Comments

 

             STAIN ACCEPTABILITY (test code = STN ACCEPTABLE)                   

                      

 

             CABOT RINGS (test code = CAB)                                      

   

 

             MORPHOLOGY COMMENT (test code = MOC)                               

          

 

             PLATELET ESTIMATE (test code = PLTEST)                             

            

 

             PLATELET MORPHOLOGY (test code = PLTMORPH)                         

                





CBC W/AUTO [id number] 02:30:00* 



             Test Item    Value        Reference Range Interpretation Comments

 

             WHITE BLOOD CELL (test code = WBC) 9.4 K/mm3    4.5-12.5     N     

        

 

             RED BLOOD CELL (test code = RBC) 3.79 mill/mm3 3.7-5.2      N      

       

 

             HEMOGLOBIN (test code = HGB) 12.4 gram/dL 11.5-15.5    N           

  

 

             HEMATOCRIT (test code = HCT) 37.1 %       36.0-46.0    N           

  

 

             MEAN CELL VOLUME (test code = MCV) 97.9 fL      80-98        N     

        

 

             MEAN CELL HGB (test code = MCH) 32.7 picogram 27.0-33.0    N       

      

 

             MEAN CELL HGB CONCETRATION (test code = MCHC) 33.4 gram/dL 33.0-36.

0    N             

 

             RED CELL DISTRIBUTION WIDTH (test code = RDW) 13.1 %       11.6-16.

2    N             

 

             RED CELL DISTRIBUTION WIDTH SD (test code = RDW-SD) 46.7 fL      37

.0-51.0    N             

 

             PLATELET COUNT (test code = PLT) 60 K/mm3     150-450      L       

      

 

             MEAN PLATELET VOLUME (test code = MPV) 12.7 fL      6.7-11.0     H 

            

 

             NEUTROPHIL % (test code = NT%) 70.8 %       39.0-69.0    H         

    

 

             IMMATURE GRANULOCYTE % (test code = IG%) 0.4 %        0.0-5.0      

N             

 

             LYMPHOCYTE % (test code = LY%) 18.5 %       25.0-55.0    L         

    

 

             MONOCYTE % (test code = MO%) 10.2 %       0.0-10.0     H           

  

 

             EOSINOPHIL % (test code = EO%) 0.0 %        0.0-5.0      N         

    

 

             BASOPHIL % (test code = BA%) 0.1 %        0.0-1.0      N           

  

 

             NUCLEATED RBC % (test code = NRBC%) 0.0 %        0-0          N    

         

 

             NEUTROPHIL # (test code = NT#) 6.68 K/mm3   1.8-7.7      N         

    

 

             IMMATURE GRANULOCYTE # (test code = IG#) 0.04 x10 3/uL 0-0.03      

 H             

 

             LYMPHOCYTE # (test code = LY#) 1.74 K/mm3   1.0-5.0      N         

    

 

             MONOCYTE # (test code = MO#) 0.96 K/mm3   0-0.8        H           

  

 

             EOSINOPHIL # (test code = EO#) 0.00 K/mm3   0.0-0.5      N         

    

 

             BASOPHIL # (test code = BA#) 0.01 K/mm3   0.0-0.2      N           

  

 

             NUCLEATED RBC # (test code = NRBC#) 0.00 K/mm3   0.0-0.1      N    

         

 

             MANUAL DIFF REQUIRED (test code = MDIFF) NO, ONLY SCAN NEEDED      

                      





DIFFERENTIAL [id number] 02:30:00* 



             Test Item    Value        Reference Range Interpretation Comments

 

             STAIN ACCEPTABILITY (test code = STN ACCEPTABLE)                   

                      

 

             CABOT RINGS (test code = CAB)                                      

   

 

             MORPHOLOGY COMMENT (test code = MOC)                               

          

 

             PLATELET ESTIMATE (test code = PLTEST)                             

            

 

             PLATELET MORPHOLOGY (test code = PLTMORPH)                         

                





CBC W/AUTO [id number] 02:30:00* 



             Test Item    Value        Reference Range Interpretation Comments

 

             WHITE BLOOD CELL (test code = WBC) 9.4 K/mm3    4.5-12.5     N     

        

 

             RED BLOOD CELL (test code = RBC) 3.79 mill/mm3 3.7-5.2      N      

       

 

             HEMOGLOBIN (test code = HGB) 12.4 gram/dL 11.5-15.5    N           

  

 

             HEMATOCRIT (test code = HCT) 37.1 %       36.0-46.0    N           

  

 

             MEAN CELL VOLUME (test code = MCV) 97.9 fL      80-98        N     

        

 

             MEAN CELL HGB (test code = MCH) 32.7 picogram 27.0-33.0    N       

      

 

             MEAN CELL HGB CONCETRATION (test code = MCHC) 33.4 gram/dL 33.0-36.

0    N             

 

             RED CELL DISTRIBUTION WIDTH (test code = RDW) 13.1 %       11.6-16.

2    N             

 

             RED CELL DISTRIBUTION WIDTH SD (test code = RDW-SD) 46.7 fL      37

.0-51.0    N             

 

             PLATELET COUNT (test code = PLT) 60 K/mm3     150-450      L       

      

 

             MEAN PLATELET VOLUME (test code = MPV) 12.7 fL      6.7-11.0     H 

            

 

             NEUTROPHIL % (test code = NT%) 70.8 %       39.0-69.0    H         

    

 

             IMMATURE GRANULOCYTE % (test code = IG%) 0.4 %        0.0-5.0      

N             

 

             LYMPHOCYTE % (test code = LY%) 18.5 %       25.0-55.0    L         

    

 

             MONOCYTE % (test code = MO%) 10.2 %       0.0-10.0     H           

  

 

             EOSINOPHIL % (test code = EO%) 0.0 %        0.0-5.0      N         

    

 

             BASOPHIL % (test code = BA%) 0.1 %        0.0-1.0      N           

  

 

             NUCLEATED RBC % (test code = NRBC%) 0.0 %        0-0          N    

         

 

             NEUTROPHIL # (test code = NT#) 6.68 K/mm3   1.8-7.7      N         

    

 

             IMMATURE GRANULOCYTE # (test code = IG#) 0.04 x10 3/uL 0-0.03      

 H             

 

             LYMPHOCYTE # (test code = LY#) 1.74 K/mm3   1.0-5.0      N         

    

 

             MONOCYTE # (test code = MO#) 0.96 K/mm3   0-0.8        H           

  

 

             EOSINOPHIL # (test code = EO#) 0.00 K/mm3   0.0-0.5      N         

    

 

             BASOPHIL # (test code = BA#) 0.01 K/mm3   0.0-0.2      N           

  

 

             NUCLEATED RBC # (test code = NRBC#) 0.00 K/mm3   0.0-0.1      N    

         

 

             MANUAL DIFF REQUIRED (test code = MDIFF) NO, ONLY SCAN NEEDED      

                      





DIFFERENTIAL [id number] 02:30:00* 



             Test Item    Value        Reference Range Interpretation Comments

 

             STAIN ACCEPTABILITY (test code = STN ACCEPTABLE)                   

                      

 

             MORPHOLOGY COMMENT (test code = MOC)                               

          

 

             PLATELET ESTIMATE (test code = PLTEST)                             

            

 

             PLATELET MORPHOLOGY (test code = PLTMORPH)                         

                





CBC W/AUTO [id number] 02:30:00* 



             Test Item    Value        Reference Range Interpretation Comments

 

             WHITE BLOOD CELL (test code = WBC) 9.4 K/mm3    4.5-12.5     N     

        

 

             RED BLOOD CELL (test code = RBC) 3.79 mill/mm3 3.7-5.2      N      

       

 

             HEMOGLOBIN (test code = HGB) 12.4 gram/dL 11.5-15.5    N           

  

 

             HEMATOCRIT (test code = HCT) 37.1 %       36.0-46.0    N           

  

 

             MEAN CELL VOLUME (test code = MCV) 97.9 fL      80-98        N     

        

 

             MEAN CELL HGB (test code = MCH) 32.7 picogram 27.0-33.0    N       

      

 

             MEAN CELL HGB CONCETRATION (test code = MCHC) 33.4 gram/dL 33.0-36.

0    N             

 

             RED CELL DISTRIBUTION WIDTH (test code = RDW) 13.1 %       11.6-16.

2    N             

 

             RED CELL DISTRIBUTION WIDTH SD (test code = RDW-SD) 46.7 fL      37

.0-51.0    N             

 

             PLATELET COUNT (test code = PLT) 60 K/mm3     150-450      L       

      

 

             MEAN PLATELET VOLUME (test code = MPV) 12.7 fL      6.7-11.0     H 

            

 

             NEUTROPHIL % (test code = NT%) 70.8 %       39.0-69.0    H         

    

 

             IMMATURE GRANULOCYTE % (test code = IG%) 0.4 %        0.0-5.0      

N             

 

             LYMPHOCYTE % (test code = LY%) 18.5 %       25.0-55.0    L         

    

 

             MONOCYTE % (test code = MO%) 10.2 %       0.0-10.0     H           

  

 

             EOSINOPHIL % (test code = EO%) 0.0 %        0.0-5.0      N         

    

 

             BASOPHIL % (test code = BA%) 0.1 %        0.0-1.0      N           

  

 

             NUCLEATED RBC % (test code = NRBC%) 0.0 %        0-0          N    

         

 

             NEUTROPHIL # (test code = NT#) 6.68 K/mm3   1.8-7.7      N         

    

 

             IMMATURE GRANULOCYTE # (test code = IG#) 0.04 x10 3/uL 0-0.03      

 H             

 

             LYMPHOCYTE # (test code = LY#) 1.74 K/mm3   1.0-5.0      N         

    

 

             MONOCYTE # (test code = MO#) 0.96 K/mm3   0-0.8        H           

  

 

             EOSINOPHIL # (test code = EO#) 0.00 K/mm3   0.0-0.5      N         

    

 

             BASOPHIL # (test code = BA#) 0.01 K/mm3   0.0-0.2      N           

  

 

             NUCLEATED RBC # (test code = NRBC#) 0.00 K/mm3   0.0-0.1      N    

         

 

             MANUAL DIFF REQUIRED (test code = MDIFF) NO, ONLY SCAN NEEDED      

                      





DIFFERENTIAL [id number] 02:30:00* 



             Test Item    Value        Reference Range Interpretation Comments

 

             STAIN ACCEPTABILITY (test code = STN ACCEPTABLE)                   

                      

 

             CABOT RINGS (test code = CAB)                                      

   

 

             MORPHOLOGY COMMENT (test code = MOC)                               

          

 

             PLATELET ESTIMATE (test code = PLTEST)                             

            

 

             PLATELET MORPHOLOGY (test code = PLTMORPH)                         

                





CBC W/AUTO [id number] 02:27:00* 



             Test Item    Value        Reference Range Interpretation Comments

 

             WHITE BLOOD CELL (test code = WBC)  K/mm3       4.5-12.5           

        

 

             RED BLOOD CELL (test code = RBC)  mill/mm3    3.7-5.2              

      

 

             HEMOGLOBIN (test code = HGB) 12.4 gram/dL 11.5-15.5    N           

  

 

             HEMATOCRIT (test code = HCT) 37.1 %       36.0-46.0    N           

  

 

             MEAN CELL VOLUME (test code = MCV)  fL          80-98              

        

 

             MEAN CELL HGB (test code = MCH)  picogram    27.0-33.0             

     

 

             MEAN CELL HGB CONCETRATION (test code = MCHC)  gram/dL     33.0-36.

0                  

 

             RED CELL DISTRIBUTION WIDTH (test code = RDW)  %           11.6-16.

2                  

 

             RED CELL DISTRIBUTION WIDTH SD (test code = RDW-SD)  fL          37

.0-51.0                  

 

             PLATELET COUNT (test code = PLT)  K/mm3       150-450              

      

 

             MEAN PLATELET VOLUME (test code = MPV)  fL          6.7-11.0       

            

 

             NEUTROPHIL % (test code = NT%)  %           39.0-69.0              

    

 

             IMMATURE GRANULOCYTE % (test code = IG%)  %           0.0-5.0      

              

 

             LYMPHOCYTE % (test code = LY%)  %           25.0-55.0              

    

 

             MONOCYTE % (test code = MO%)  %           0.0-10.0                 

  

 

             EOSINOPHIL % (test code = EO%)  %           0.0-5.0                

    

 

             BASOPHIL % (test code = BA%)  %           0.0-1.0                  

  

 

             NEUTROPHIL # (test code = NT#)  K/mm3       1.8-7.7                

    

 

             LYMPHOCYTE # (test code = LY#)  K/mm3       1.0-5.0                

    

 

             MONOCYTE # (test code = MO#)  K/mm3       0-0.8                    

  

 

             EOSINOPHIL # (test code = EO#)  K/mm3       0.0-0.5                

    

 

             BASOPHIL # (test code = BA#)  K/mm3       0.0-0.2                  

  





- US ABDOMEN [id number] 17:54:00  Name: REYES,JOSEPHINE                 
 [facility]                     : 1955 Age/S: 64  / F         Xavi May [facility]                Unit #: [id number]     Loc:               [place],  
[place]  [address]              Phys: Azucena Obrien                               
                Acct: [id number]  Dis Date:               Status: ADM IN      
                           PHONE #: [phone]     Exam Date: 2020
                    FAX #: [phone]      Reason: ruq abd pain               
                        EXAMS:                                               CPT
CODE:      [id number] US ABDOMEN COMPLETE                        [id number]           
        REASON FOR EXAM: ruq abd pain               EXAM ORDER DATE: 2020 
3:52 PM               Attending M.D.: ANTHONY Mohr               
PROCEDURE:  - US ABDOMEN COMPLETE               Technique: Grayscale and color 
Doppler images of the abdomen.               Comparison study: CT of the abdomen
and pelvis earlier today               FINDINGS:                Aorta and IVC: 
Patent and grossly normal in caliber.               Liver:        Size: 15.8 cm 
craniocaudally       Parenchyma and contour: Smooth contour.  Normal 
echogenicity.       Cysts and/or masses: None.               Intrahepatic bile 
ducts: No intrahepatic biliary ductal dilation               Common bile duct: 
2.7 mm in diameter.  No echogenic filling defects in       visualized duct.     
         Gallbladder:        Stones/sludge: No intraluminal stones or sludge.   
   Wall: 1.7 mm in thickness.  No discontinuity.  No polyps.  No       
pericholecystic fluid.  No hyperemia.       Sonographic Buckner's sign: Negative 
             Portal vein: Portal vein caliber is within normal limits.  Portal 
vein       is patent with hepatopetal flow.               Pancreas: Incompletely
visualized. However the visualized portions are       grossly within normal 
limits.               Right kidney:       parenchyma echogenicity: Normal 
echogenicity       size: 10.5 x 4.6 x 3.7 cm        stones: none       
cysts/masses: none       hydronephrosis: none              PAGE  1              
        Signed Report                    (CONTINUED)   Name: REYES,JOSEPHINE    
              [facility]                     : 1955 Age/S: 64  / F 
       [address] [facility]                Unit #: [id number]     Loc:              
[place]  [address]              Phys: Azucena Obrien                    
                           Acct: [id number]  Dis Date:               Status: 
ADM IN                                  PHONE #: [phone]     Exam Date: 
2020                     FAX #: [phone]      Reason: ruq abd p
ain                                        EXAMS:                               
               CPT CODE:      [id number] US ABDOMEN COMPLETE                     
  [id number]               <Continued>        Left kidney:        parenchyma 
echogenicity: Normal echogenicity       size: 9.1 x 4.8 x 4.0 cm        stones: 
none       cysts/masses: none       hydronephrosis: none                Spleen: 
     size: 8.3 x 2.6 x 3.2 cm       cysts/masses:  Parenchyma is sonographically
unremarkable.               Ascites/pleural effusions: None                 
IMPRESSION:                    Sonographically unremarkable abdomen.            
      Location: [facility]          ** Electronically Signed by Gary Wild MD on 
2020 at 1754 **                      Reported and signed by: Gary Wild MD                     CC: Agusto Rae MD; Azucena Obrien          
                                                                                
 Technologist: NICKIE ALONZO RT(R),RDMS                          [facility] 
Date/Time: 2020 () [id number]                       Orig Print D/T: S:
2020 ()     Probe:                       PAGE  2                      
Signed Report                               LACTIC [id number] 16:40:00* 



             Test Item    Value        Reference Range Interpretation Comments

 

             LACTIC ACID (test code = LACT) 1.8 mmol/L   0.4-1.9      N         

    





BASIC METABOLIC [id number] 14:12:00* 



             Test Item    Value        Reference Range Interpretation Comments

 

             SODIUM (test code = NA) 132 mmol/L   136-145      L             

 

             POTASSIUM (test code = K) 2.5 mmol/L   3.5-5.1      LL           Re

sults called to [id number] by 

[facility] 20 1411Critical results verified and read back by Nurse? Y

 

             CHLORIDE (test code = CL) 86.0 mmol/L         L             

 

             CARBON DIOXIDE (test code = CO2) 36.0 mmol/L  21-32        H       

      

 

             ANION GAP (test code = GAP) 12.5         10-20        N            

 

 

             GLUCOSE (test code = GLU) 128 mg/dL           H             

 

             BLOOD UREA NITROGEN (test code = BUN) 9 mg/dL      7-18         N  

           

 

             GLOMERULAR FILTRATION RATE (test code = GFR) 45 mL/min    >=60     

                 Estimated GFR by 

using Modified MDRD formula.Chronic kidney disease is defined as either kidney 
damageor GFR <60 mL/min/1.73 m2 for >3 months.

 

             CREATININE (test code = CREAT) 1.20 mg/dL   0.55-1.02    H         

   **Note change in reference

range due to change in reagent.**

 

             BUN/CREATININE RATIO (test code = BUN/CREA) 7.5          10-20     

   L             

 

             CALCIUM (test code = CA) 8.6 mg/dL    8.5-10.1     N             





CBC W/AUTO [id number] 13:58:00* 



             Test Item    Value        Reference Range Interpretation Comments

 

             WHITE BLOOD CELL (test code = WBC) 10.6 K/mm3   4.5-12.5     N     

        

 

             RED BLOOD CELL (test code = RBC) 4.25 mill/mm3 3.7-5.2      N      

       

 

             HEMOGLOBIN (test code = HGB) 13.7 gram/dL 11.5-15.5                

 RESULT VERIFIED BY REPEAT 

ANALYSIS

 

             HEMATOCRIT (test code = HCT) 40.6 %       36.0-46.0    N           

  

 

             MEAN CELL VOLUME (test code = MCV) 95.5 fL      80-98        N     

        

 

             MEAN CELL HGB (test code = MCH) 32.2 picogram 27.0-33.0    N       

      

 

             MEAN CELL HGB CONCETRATION (test code = MCHC) 33.7 gram/dL 33.0-36.

0    N             

 

             RED CELL DISTRIBUTION WIDTH (test code = RDW) 12.8 %       11.6-16.

2    N             

 

             RED CELL DISTRIBUTION WIDTH SD (test code = RDW-SD) 44.7 fL      37

.0-51.0    N             

 

             PLATELET COUNT (test code = PLT) 72 K/mm3     150-450      L       

      

 

             MEAN PLATELET VOLUME (test code = MPV) 11.8 fL      6.7-11.0     H 

            

 

             NEUTROPHIL % (test code = NT%) 73.2 %       39.0-69.0    H         

    

 

             IMMATURE GRANULOCYTE % (test code = IG%) 0.5 %        0.0-5.0      

N             

 

             LYMPHOCYTE % (test code = LY%) 15.5 %       25.0-55.0    L         

    

 

             MONOCYTE % (test code = MO%) 10.7 %       0.0-10.0     H           

  

 

             EOSINOPHIL % (test code = EO%) 0.0 %        0.0-5.0      N         

    

 

             BASOPHIL % (test code = BA%) 0.1 %        0.0-1.0      N           

  

 

             NUCLEATED RBC % (test code = NRBC%) 0.0 %        0-0          N    

         

 

             NEUTROPHIL # (test code = NT#) 7.73 K/mm3   1.8-7.7      H         

    

 

             IMMATURE GRANULOCYTE # (test code = IG#) 0.05 x10 3/uL 0-0.03      

 H             

 

             LYMPHOCYTE # (test code = LY#) 1.64 K/mm3   1.0-5.0      N         

    

 

             MONOCYTE # (test code = MO#) 1.13 K/mm3   0-0.8        H           

  

 

             EOSINOPHIL # (test code = EO#) 0.00 K/mm3   0.0-0.5      N         

    

 

             BASOPHIL # (test code = BA#) 0.01 K/mm3   0.0-0.2      N           

  

 

             NUCLEATED RBC # (test code = NRBC#) 0.00 K/mm3   0.0-0.1      N    

         

 

             MANUAL DIFF REQUIRED (test code = MDIFF) NO, ONLY SCAN NEEDED      

                      





DIFFERENTIAL [id number] 13:58:00* 



             Test Item    Value        Reference Range Interpretation Comments

 

             STAIN ACCEPTABILITY (test code = STN ACCEPTABLE) STAIN ACCEPTABLE  

                          

 

             ANISOCYTOSIS (test code = ANISO) 2+                                

      

 

             MACROCYTOSIS (test code = MACR) 2+                                 

     

 

             PLATELET ESTIMATE (test code = PLTEST) DECREASED                   

            

 

             PLATELET MORPHOLOGY (test code = PLTMORPH) NORMAL                  

                





LACTIC [id number] 13:24:00* 



             Test Item    Value        Reference Range Interpretation Comments

 

             LACTIC ACID (test code = LACT) 3.3 mmol/L   0.4-1.9      HH        

   Results called to [id number] 

by [facility] 20 1322Critical results verified and read back by Nurse? Y





CBC W/AUTO [id number] 13:20:00* 



             Test Item    Value        Reference Range Interpretation Comments

 

             WHITE BLOOD CELL (test code = WBC) 10.6 K/mm3   4.5-12.5     N     

        

 

             RED BLOOD CELL (test code = RBC) 4.25 mill/mm3 3.7-5.2      N      

       

 

             HEMOGLOBIN (test code = HGB) 13.7 gram/dL 11.5-15.5                

 RESULT VERIFIED BY REPEAT 

ANALYSIS

 

             HEMATOCRIT (test code = HCT) 40.6 %       36.0-46.0    N           

  

 

             MEAN CELL VOLUME (test code = MCV) 95.5 fL      80-98        N     

        

 

             MEAN CELL HGB (test code = MCH) 32.2 picogram 27.0-33.0    N       

      

 

             MEAN CELL HGB CONCETRATION (test code = MCHC) 33.7 gram/dL 33.0-36.

0    N             

 

             RED CELL DISTRIBUTION WIDTH (test code = RDW) 12.8 %       11.6-16.

2    N             

 

             RED CELL DISTRIBUTION WIDTH SD (test code = RDW-SD) 44.7 fL      37

.0-51.0    N             

 

             PLATELET COUNT (test code = PLT) 72 K/mm3     150-450      L       

      

 

             MEAN PLATELET VOLUME (test code = MPV) 11.8 fL      6.7-11.0     H 

            

 

             NEUTROPHIL % (test code = NT%) 73.2 %       39.0-69.0    H         

    

 

             IMMATURE GRANULOCYTE % (test code = IG%) 0.5 %        0.0-5.0      

N             

 

             LYMPHOCYTE % (test code = LY%) 15.5 %       25.0-55.0    L         

    

 

             MONOCYTE % (test code = MO%) 10.7 %       0.0-10.0     H           

  

 

             EOSINOPHIL % (test code = EO%) 0.0 %        0.0-5.0      N         

    

 

             BASOPHIL % (test code = BA%) 0.1 %        0.0-1.0      N           

  

 

             NUCLEATED RBC % (test code = NRBC%) 0.0 %        0-0          N    

         

 

             NEUTROPHIL # (test code = NT#) 7.73 K/mm3   1.8-7.7      H         

    

 

             IMMATURE GRANULOCYTE # (test code = IG#) 0.05 x10 3/uL 0-0.03      

 H             

 

             LYMPHOCYTE # (test code = LY#) 1.64 K/mm3   1.0-5.0      N         

    

 

             MONOCYTE # (test code = MO#) 1.13 K/mm3   0-0.8        H           

  

 

             EOSINOPHIL # (test code = EO#) 0.00 K/mm3   0.0-0.5      N         

    

 

             BASOPHIL # (test code = BA#) 0.01 K/mm3   0.0-0.2      N           

  

 

             NUCLEATED RBC # (test code = NRBC#) 0.00 K/mm3   0.0-0.1      N    

         

 

             MANUAL DIFF REQUIRED (test code = MDIFF) NO, ONLY SCAN NEEDED      

                      





DIFFERENTIAL [id number] 13:20:00* 



             Test Item    Value        Reference Range Interpretation Comments

 

             STAIN ACCEPTABILITY (test code = STN ACCEPTABLE)                   

                      

 

             CABOT RINGS (test code = CAB)                                      

   

 

             MORPHOLOGY COMMENT (test code = MOC)                               

          

 

             PLATELET ESTIMATE (test code = PLTEST)                             

            

 

             PLATELET MORPHOLOGY (test code = PLTMORPH)                         

                





CBC W/AUTO [id number] 13:20:00* 



             Test Item    Value        Reference Range Interpretation Comments

 

             WHITE BLOOD CELL (test code = WBC) 10.6 K/mm3   4.5-12.5     N     

        

 

             RED BLOOD CELL (test code = RBC) 4.25 mill/mm3 3.7-5.2      N      

       

 

             HEMOGLOBIN (test code = HGB) 13.7 gram/dL 11.5-15.5                

 RESULT VERIFIED BY REPEAT 

ANALYSIS

 

             HEMATOCRIT (test code = HCT) 40.6 %       36.0-46.0    N           

  

 

             MEAN CELL VOLUME (test code = MCV) 95.5 fL      80-98        N     

        

 

             MEAN CELL HGB (test code = MCH) 32.2 picogram 27.0-33.0    N       

      

 

             MEAN CELL HGB CONCETRATION (test code = MCHC) 33.7 gram/dL 33.0-36.

0    N             

 

             RED CELL DISTRIBUTION WIDTH (test code = RDW) 12.8 %       11.6-16.

2    N             

 

             RED CELL DISTRIBUTION WIDTH SD (test code = RDW-SD) 44.7 fL      37

.0-51.0    N             

 

             PLATELET COUNT (test code = PLT) 72 K/mm3     150-450      L       

      

 

             MEAN PLATELET VOLUME (test code = MPV) 11.8 fL      6.7-11.0     H 

            

 

             NEUTROPHIL % (test code = NT%) 73.2 %       39.0-69.0    H         

    

 

             IMMATURE GRANULOCYTE % (test code = IG%) 0.5 %        0.0-5.0      

N             

 

             LYMPHOCYTE % (test code = LY%) 15.5 %       25.0-55.0    L         

    

 

             MONOCYTE % (test code = MO%) 10.7 %       0.0-10.0     H           

  

 

             EOSINOPHIL % (test code = EO%) 0.0 %        0.0-5.0      N         

    

 

             BASOPHIL % (test code = BA%) 0.1 %        0.0-1.0      N           

  

 

             NUCLEATED RBC % (test code = NRBC%) 0.0 %        0-0          N    

         

 

             NEUTROPHIL # (test code = NT#) 7.73 K/mm3   1.8-7.7      H         

    

 

             IMMATURE GRANULOCYTE # (test code = IG#) 0.05 x10 3/uL 0-0.03      

 H             

 

             LYMPHOCYTE # (test code = LY#) 1.64 K/mm3   1.0-5.0      N         

    

 

             MONOCYTE # (test code = MO#) 1.13 K/mm3   0-0.8        H           

  

 

             EOSINOPHIL # (test code = EO#) 0.00 K/mm3   0.0-0.5      N         

    

 

             BASOPHIL # (test code = BA#) 0.01 K/mm3   0.0-0.2      N           

  

 

             NUCLEATED RBC # (test code = NRBC#) 0.00 K/mm3   0.0-0.1      N    

         

 

             MANUAL DIFF REQUIRED (test code = MDIFF) NO, ONLY SCAN NEEDED      

                      





DIFFERENTIAL [id number] 13:20:00* 



             Test Item    Value        Reference Range Interpretation Comments

 

             STAIN ACCEPTABILITY (test code = STN ACCEPTABLE)                   

                      

 

             CABOT RINGS (test code = CAB)                                      

   

 

             MORPHOLOGY COMMENT (test code = MOC)                               

          

 

             PLATELET ESTIMATE (test code = PLTEST)                             

            

 

             PLATELET MORPHOLOGY (test code = PLTMORPH)                         

                





CBC W/AUTO [id number] 13:20:00* 



             Test Item    Value        Reference Range Interpretation Comments

 

             WHITE BLOOD CELL (test code = WBC) 10.6 K/mm3   4.5-12.5     N     

        

 

             RED BLOOD CELL (test code = RBC) 4.25 mill/mm3 3.7-5.2      N      

       

 

             HEMOGLOBIN (test code = HGB) 13.7 gram/dL 11.5-15.5                

 RESULT VERIFIED BY REPEAT 

ANALYSIS

 

             HEMATOCRIT (test code = HCT) 40.6 %       36.0-46.0    N           

  

 

             MEAN CELL VOLUME (test code = MCV) 95.5 fL      80-98        N     

        

 

             MEAN CELL HGB (test code = MCH) 32.2 picogram 27.0-33.0    N       

      

 

             MEAN CELL HGB CONCETRATION (test code = MCHC) 33.7 gram/dL 33.0-36.

0    N             

 

             RED CELL DISTRIBUTION WIDTH (test code = RDW) 12.8 %       11.6-16.

2    N             

 

             RED CELL DISTRIBUTION WIDTH SD (test code = RDW-SD) 44.7 fL      37

.0-51.0    N             

 

             PLATELET COUNT (test code = PLT) 72 K/mm3     150-450      L       

      

 

             MEAN PLATELET VOLUME (test code = MPV) 11.8 fL      6.7-11.0     H 

            

 

             NEUTROPHIL % (test code = NT%) 73.2 %       39.0-69.0    H         

    

 

             IMMATURE GRANULOCYTE % (test code = IG%) 0.5 %        0.0-5.0      

N             

 

             LYMPHOCYTE % (test code = LY%) 15.5 %       25.0-55.0    L         

    

 

             MONOCYTE % (test code = MO%) 10.7 %       0.0-10.0     H           

  

 

             EOSINOPHIL % (test code = EO%) 0.0 %        0.0-5.0      N         

    

 

             BASOPHIL % (test code = BA%) 0.1 %        0.0-1.0      N           

  

 

             NUCLEATED RBC % (test code = NRBC%) 0.0 %        0-0          N    

         

 

             NEUTROPHIL # (test code = NT#) 7.73 K/mm3   1.8-7.7      H         

    

 

             IMMATURE GRANULOCYTE # (test code = IG#) 0.05 x10 3/uL 0-0.03      

 H             

 

             LYMPHOCYTE # (test code = LY#) 1.64 K/mm3   1.0-5.0      N         

    

 

             MONOCYTE # (test code = MO#) 1.13 K/mm3   0-0.8        H           

  

 

             EOSINOPHIL # (test code = EO#) 0.00 K/mm3   0.0-0.5      N         

    

 

             BASOPHIL # (test code = BA#) 0.01 K/mm3   0.0-0.2      N           

  

 

             NUCLEATED RBC # (test code = NRBC#) 0.00 K/mm3   0.0-0.1      N    

         

 

             MANUAL DIFF REQUIRED (test code = MDIFF) NO, ONLY SCAN NEEDED      

                      





DIFFERENTIAL [id number] 13:20:00* 



             Test Item    Value        Reference Range Interpretation Comments

 

             STAIN ACCEPTABILITY (test code = STN ACCEPTABLE)                   

                      

 

             MORPHOLOGY COMMENT (test code = MOC)                               

          

 

             PLATELET ESTIMATE (test code = PLTEST)                             

            

 

             PLATELET MORPHOLOGY (test code = PLTMORPH)                         

                





CBC W/AUTO [id number] 13:20:00* 



             Test Item    Value        Reference Range Interpretation Comments

 

             WHITE BLOOD CELL (test code = WBC) 10.6 K/mm3   4.5-12.5     N     

        

 

             RED BLOOD CELL (test code = RBC) 4.25 mill/mm3 3.7-5.2      N      

       

 

             HEMOGLOBIN (test code = HGB) 13.7 gram/dL 11.5-15.5                

 RESULT VERIFIED BY REPEAT 

ANALYSIS

 

             HEMATOCRIT (test code = HCT) 40.6 %       36.0-46.0    N           

  

 

             MEAN CELL VOLUME (test code = MCV) 95.5 fL      80-98        N     

        

 

             MEAN CELL HGB (test code = MCH) 32.2 picogram 27.0-33.0    N       

      

 

             MEAN CELL HGB CONCETRATION (test code = MCHC) 33.7 gram/dL 33.0-36.

0    N             

 

             RED CELL DISTRIBUTION WIDTH (test code = RDW) 12.8 %       11.6-16.

2    N             

 

             RED CELL DISTRIBUTION WIDTH SD (test code = RDW-SD) 44.7 fL      37

.0-51.0    N             

 

             PLATELET COUNT (test code = PLT) 72 K/mm3     150-450      L       

      

 

             MEAN PLATELET VOLUME (test code = MPV) 11.8 fL      6.7-11.0     H 

            

 

             NEUTROPHIL % (test code = NT%) 73.2 %       39.0-69.0    H         

    

 

             IMMATURE GRANULOCYTE % (test code = IG%) 0.5 %        0.0-5.0      

N             

 

             LYMPHOCYTE % (test code = LY%) 15.5 %       25.0-55.0    L         

    

 

             MONOCYTE % (test code = MO%) 10.7 %       0.0-10.0     H           

  

 

             EOSINOPHIL % (test code = EO%) 0.0 %        0.0-5.0      N         

    

 

             BASOPHIL % (test code = BA%) 0.1 %        0.0-1.0      N           

  

 

             NUCLEATED RBC % (test code = NRBC%) 0.0 %        0-0          N    

         

 

             NEUTROPHIL # (test code = NT#) 7.73 K/mm3   1.8-7.7      H         

    

 

             IMMATURE GRANULOCYTE # (test code = IG#) 0.05 x10 3/uL 0-0.03      

 H             

 

             LYMPHOCYTE # (test code = LY#) 1.64 K/mm3   1.0-5.0      N         

    

 

             MONOCYTE # (test code = MO#) 1.13 K/mm3   0-0.8        H           

  

 

             EOSINOPHIL # (test code = EO#) 0.00 K/mm3   0.0-0.5      N         

    

 

             BASOPHIL # (test code = BA#) 0.01 K/mm3   0.0-0.2      N           

  

 

             NUCLEATED RBC # (test code = NRBC#) 0.00 K/mm3   0.0-0.1      N    

         

 

             MANUAL DIFF REQUIRED (test code = MDIFF) NO, ONLY SCAN NEEDED      

                      





DIFFERENTIAL [id number] 13:20:00* 



             Test Item    Value        Reference Range Interpretation Comments

 

             STAIN ACCEPTABILITY (test code = STN ACCEPTABLE)                   

                      

 

             CABOT RINGS (test code = CAB)                                      

   

 

             MORPHOLOGY COMMENT (test code = MOC)                               

          

 

             PLATELET ESTIMATE (test code = PLTEST)                             

            

 

             PLATELET MORPHOLOGY (test code = PLTMORPH)                         

                





URINALYSIS [id number] 12:28:00* 



             Test Item    Value        Reference Range Interpretation Comments

 

             UA COLOR (test code = COLU) COLORLESS    YELLOW       A            

 

 

             UA APPEARANCE (test code = APPU) CLEAR        CLEAR                

      

 

             UA GLUCOSE DIPSTICK (test code = DGLUU) NEGATIVE mg/dL NEGATIVE    

               

 

             UA BILIRUBIN DIPSTICK (test code = BILU) NEGATIVE mg/dL NEGATIVE   

                

 

             UA KETONE DIPSTICK (test code = KETU) NEGATIVE mg/dL NEGATIVE      

             

 

             UA SPECIFIC GRAVITY (test code = SGU) >1.050       1.001-1.035     

           

 

             UA BLOOD DIPSTICK (test code = CLARE) 0.03 mg/dL (Trace) mg/dL NEGATI

VE     A             

 

             UA PH DIPSTICK (test code = SHAWN) 8.5          5.0-8.0              

      

 

             UA PROTEIN DIPSTICK (test code = PROU) 30 (1+) mg/dL NEGATIVE     A

             

 

             UA UROBILINIOGEN DIPSTICK (test code = URO) Normal mg/dL NEGATIVE  

                 

 

             UA NITRITE DIPSTICK (test code = PRISCA) NEGATIVE     NEGATIVE       

            

 

             UA LEUKOCYTE ESTERASE W REFLEX (test code = LEUUR) NEGATIVE Tree/uL 

NEGATIVE                   

 

             UA WBC (test code = WBCU) 0-5 per HPF  0-5                        

 

             UA RBC (test code = RBCU) 0-2 #/HPF    0-5                        

 

             UA EPITHELIAL CELLS (test code = EPIU) FEW per HPF  FEW            

            

 

             UA BACTERIA (test code = BACU) FEW #/HPF    NONE         A         

    

 

             UA MUCUS (test code = MUCU) FEW #/LPF    FEW                       

 





Urine Source? Clean Catch- CT CHEST W/O [id number] 12:17:00  Name: 
REYES,JOSEPHINE                   [facility]                     : 
1955 Age/S: 64  / F         [address]                [address] #: [id number]     Loc:               [place]  [address]              Phys: Agusto Rae MD                                               Acct: [id number]  Di
s Date:               Status: ADM IN                                  PHONE #: [phone]
[phone]     Exam Date: 2020  1155                     FAX #: [phone]
[phone]      Reason: hemoptysis, cough                                   EXAMS:     
                                         CPT CODE:      [id number] CT CHEST W/O 
CONTRAST                      [address]                    HISTORY: Hemoptysis and c
ough.               COMPARISON: Chest x-ray from same day.               CT ches
t without contrast: Automated exposure control.               Location: [facility].    
          The lungs are clear of infiltrates, effusion or congestion. No       
bronchiectasis, honeycombing or fibrosis or endobronchial lesions are       note
d. No mass or nodules.               Normal caliber unopacified aorta and pulmon
alireza arteries. Unremarkable       incompletely included thyroid glands. Esophagus
is dilated and       fluid-filled with thickened wall. Correlate for esophagiti
s. Direct       visualization. No pathologic adenopathy. Cardiac silhouette is n
ormal       without pericardial effusion.               Visualized upper abdomen
demonstrating moderately distended       gallbladder. Hyperplastic left adrenal.
The subcutaneous tissues and       the musculature are normal in appearance. No 
lytic or blastic lesions       are noted within the bony skeleton.              
  IMPRESSION:                   Diffuse dilatation of the entire esophagus wi
thout wall thickening.         Correlate for esophagitis with direct visualizati
on. The lungs are         clear of mass and infiltrates and effusion or congesti
on. No         pathologic adenopathy.          ** Electronically Signed by DANNA No on 2020 at 1217 **                      Reported and signed b
y: Star No M.D.          CC: Agusto Rae MD                        
                                                                                
         Technologist:Deondre Nation RT(R)(CT)     CTDI:        DLP:        
Trnscb Date/Time: 2020 (1217) t.SDR.TH4                        Orig Print 
D/T: S: 2020 (9542)      PAGE  1                       Signed Report      
                        LACTIC [id number] 10:31:00* 



             Test Item    Value        Reference Range Interpretation Comments

 

             LACTIC ACID (test code = LACT) 2.4 mmol/L   0.4-1.9      HH        

   Results called to [id number] 

by V.LAB.KA 20 1031Critical results verified and read back by Nurse? Y





PROCALCITONIN (PCT)2020 09:53:00* 



             Test Item    Value        Reference Range Interpretation Comments

 

             PROCALCITONIN (PCT) (test code = PROCAL) 6.02 ng/ml                

             Concentration   

Interpretation   (ng/mL)      -------------   
--------------------------------------------<0.51           Sepsis is not 
likely.                Local bacterial infection is possible.                
(LOW RISK for progression to Sepsis) 0.51 - 2.00     Sepsis is possible, but 
other conditions                are known to elevate PCT as well.               
(MODERATE RISK for progression to Sepsis) > 2.00          Sepsis is likely, 
unless other causes are                known.                (HIGH RISK for 
progression to Severe Sepsis                or Septic Shock) 10.00           
High likelihood of Severe Sepsis or Septic  or higher     Shock.                
*Increased PCT levels may not always be related to systemic bacterial 
infection.*Low PCT levels do not automatically exclude the presence of bacterial
infection.*All results should be interpreted taking into account the  patients 
history.





CRYSTAL- CT ABD PELVIS [id number] 09:22:00  Name: REYES,JOSEPHINE             
     [facility]                     : 1955 Age/S: 64  / F         
[address]                Unit #: [id number]     Loc:               
[place]  [address]              Phys: Dina Montano MD                    
                           Acct: [id number]  Dis Date:               Status: 
ADM IN                                  PHONE #: [phone]     Exam Date: 
202026                     FAX #: [phone]      Reason: pain      
                                         EXAMS:                                 
             CPT CODE:      [id number] CT ABD PELVIS W/CONT                      
[address]                    HISTORY: Pain.               COMPARISON: CT 2019.               Location: [facility].               CT abdomen and pelvis with 
IV contrast: 100 mL of Isovue-370.       Automated exposure control.            
  CT ABDOMEN:               The lung bases are clear.               The liver is
enhancing homogeneously. No parenchymal mass or lesions.       Portal vein and 
hepatic artery are patent. Gallbladder is without       radiopaque stones. The 
liver measured 17.6 cm in length.               Unremarkable spleen. The stomach
distended incompletely severely       thickened distal esophagus. Correlate 
severe esophagitis and direct       visualization is recommended.               
The pancreas is enhancing homogeneously. Unremarkable adrenals with       mild 
hyperplasia on the left.               Kidneys are free from 
hydroureteronephrosis. Homogeneous enhancement.       Bilateral excretion.      
        No pathologic adenopathy. Well opacified abdominal and pelvic       
vasculature.               No bowel obstruction or colitis or diverticulitis or 
enteritis.               CT PELVIS:               Appendix is normal. Pelvic kelley
wel loops are unobstructed. Mild sigmoid       diverticulosis. No diverticulitis
.               Unremarkable incompletely distended urinary bladder. Atrophied u
terus.       No free fluid or free air or abscess. No pelvic pathologic adenopat
hy.               Subcutaneous tissues and the musculature are normal in appeara
nce. No       lytic or blastic lesions are noted within the bony skeleton. DJD. 
               IMPRESSION:               PAGE  1                       Signed R
[place]                    (CONTINUED)   Name: REYES,JOSEPHINE                   [facility]                     : 1955 Age/S: 64  / F         [address]                Unit #: [id number]     Loc:               [place]
 [address]              Phys: Dina Montano MD                                  
             Acct: [id number]  Dis Date:               Status: ADM IN         
                        PHONE #: [phone]     Exam Date: 2020   
                 FAX #: [phone]      Reason: pain                          
                     EXAMS:                                               CPT 
CODE:      [id number] CT ABD PELVIS W/CONT                       [address]           
   <Continued>          Normal appendix without bowel obstruction or colitis or 
diverticulitis         or enteritis. Severely thickened distal esophagus. 
Correlate with         direct visualization to exclude esophagitis and/or 
malignancy.                   No hydroureteronephrosis with unremarkable 
incompletely distended         urinary bladder. No free fluid or free air or 
abscess.          ** Electronically Signed by DANNA No on 2020 at
0922 **                      Reported and signed by: Star No M.D.         
                      CC: Dina Montano MD                                  
                                                                                
Technologist:Pedro Campos RT(R),(MR),(CT)   CTDI:        DLP:        Trnscb 
Date/Time: 2020 (922) t.SDR.TH4                        Orig Print D/T: S:
2020 (1884)      PAGE  2                       Signed Report              
                - XR CHEST 1 -56-52 08:44:00 FAX:         Dina Montano [phone]    Tensed:    St: REG----------
---------------------------------------------------------------------  Name:   OVIDIO SALAZAR                  [facility]                     : [birth year]  Age/S: 64/F           [address]                Unit #: [id number]    
 Loc: [place]  [address]              Phys: Dina Montano MD 
                                              Acct: [id number] Dis Date:      
        Status: REG ER                                 PHONE #: [phone]    
Exam Date:     2020                   FAX #: [phone]     
Reason: abd pain, elev lactic acid                         EXAMS:               
                               CPT CODE:      [id number] XR CHEST 1 V            
                  [address]                    HISTORY: Abdominal pain and elevated 
lactic acid.               COMPARISON: 2019.               Locat
ion: [facility].               No acute infiltrates, effusion or congestion is noted. M
ild bullous       changes with upper lobe predominance The cardiac and mediastin
al       silhouette are within normal limits.                  IMPRESSION:      
             No acute infiltrates, effusion or congestion.          ** Electron
ically Signed by DANNA No on 2020 at 0844 **                     
Reported and signed by: Star No M.D.                           CC: Dina Pollack MD                                                                  
                                                 Technologist: Marisela Stone RT(
R); STUDENT TECHNOLOGIST               Trnscrd Date/Time/By: 2020 (3853) :
By: [email or web address]           Orig Print D/T: S: 2020 (0440)                    
    PAGE  1                       Signed Report                               
LACTIC [id number] 08:07:00* 



             Test Item    Value        Reference Range Interpretation Comments

 

             LACTIC ACID (test code = LACT) 4.7 mmol/L   0.4-1.9      HH        

   Results called to [id number] 

by V.LAB.KA 20 0806Critical results verified and read back by Nurse? Y





BASIC METABOLIC [id number] 08:06:00* 



             Test Item    Value        Reference Range Interpretation Comments

 

             SODIUM (test code = NA) 128 mmol/L   136-145      L             

 

             POTASSIUM (test code = K) 2.1 mmol/L   3.5-5.1      LL           Re

sults called to [id number] by 

V.LAB.KA 20 0806Critical results verified and read back by Nurse? Y

 

             CHLORIDE (test code = CL) 80.0 mmol/L         L             

 

             CARBON DIOXIDE (test code = CO2) 35.0 mmol/L  21-32        H       

      

 

             ANION GAP (test code = GAP) 15.1         10-20        N            

 

 

             GLUCOSE (test code = GLU) 126 mg/dL           H             

 

             BLOOD UREA NITROGEN (test code = BUN) 9 mg/dL      7-18         N  

           

 

             GLOMERULAR FILTRATION RATE (test code = GFR) 32 mL/min    >=60     

                 Estimated GFR by 

using Modified MDRD formula.Chronic kidney disease is defined as either kidney 
damageor GFR <60 mL/min/1.73 m2 for >3 months.

 

             CREATININE (test code = CREAT) 1.60 mg/dL   0.55-1.02    H         

   **Note change in reference

range due to change in reagent.**

 

             BUN/CREATININE RATIO (test code = BUN/CREA) 5.6          10-20     

   L             

 

             CALCIUM (test code = CA) 9.9 mg/dL    8.5-10.1     N             





HEPATIC FUNCTION [id number] 08:06:00* 



             Test Item    Value        Reference Range Interpretation Comments

 

             TOTAL PROTEIN (test code = PROT) 8.1 gram/dL  6.4-8.2      N       

      

 

             ALBUMIN (test code = ALB) 3.2 g/dL     3.4-5.0      L             

 

             GLOBULIN (test code = GLOB) 4.9 gram/dL  2.7-4.2      H            

 

 

             ALBUMIN/GLOBULIN RATIO (test code = A/G) 0.7          0.75-1.50    

L             

 

             BILIRUBIN TOTAL (test code = BILT) 0.80 mg/dL   0.0-1.0      N     

        

 

             BILIRUBIN DIRECT (test code = BILD) 0.24 mg/dL   0.0-0.20     H    

         

 

             SGOT/AST (test code = AST) 61 IUnit/L   15-37        H             

 

             SGPT/ALT (test code = ALT) 38 IUnit/L   12-78        N             

 

             ALKALINE PHOSPHATASE TOTAL (test code = ALKP) 92 IUnit/L     

     N            **Note change 

in reference range due to change in reagent.**





[id number] 08:06:00* 



             Test Item    Value        Reference Range Interpretation Comments

 

             LIPASE (test code = LIP) 87 U/L       73.0-393.0   N             





CBC W/O [id number] 06:39:00* 



             Test Item    Value        Reference Range Interpretation Comments

 

             WHITE BLOOD CELL (test code = WBC) 10.9 K/mm3   4.5-12.5     N     

        

 

             RED BLOOD CELL (test code = RBC) 5.09 mill/mm3 3.7-5.2      N      

       

 

             HEMOGLOBIN (test code = HGB) 16.7 gram/dL 11.5-15.5    H           

  

 

             HEMATOCRIT (test code = HCT) 48.8 %       36.0-46.0    H           

  

 

             MEAN CELL VOLUME (test code = MCV) 95.9 fL      80-98        N     

        

 

             MEAN CELL HGB (test code = MCH) 32.8 picogram 27.0-33.0    N       

      

 

             MEAN CELL HGB CONCETRATION (test code = MCHC) 34.2 gram/dL 33.0-36.

0    N             

 

             RED CELL DISTRIBUTION WIDTH (test code = RDW) 12.8 %       11.6-16.

2    N             

 

             PLATELET COUNT (test code = PLT) 93 K/mm3     150-450      L       

      

 

             MEAN PLATELET VOLUME (test code = MPV) 12.5 fL      6.7-11.0     H 

            





PROTHROMBIN [id number] 06:32:00* 



             Test Item    Value        Reference Range Interpretation Comments

 

             PROTHROMBIN TIME PATIENT (test code = PTP) 9.8 seconds  9.0-14.0   

  N             

 

             INTERNATIONAL NORMAL RATIO (test code = INR) 0.8          0.8-1.2  

    N            The therapeutic range

for oral anticoagulant therapy formost indications is an international 
normalized ratio (INR)of between 2.0 and 3.0.  The recommended therapeutic 
INRrange for various clinical situations is listed 
below:_________________________________________________________Clinical 
Situation                          INR 
range_________________________________________________________ Pulmonary e
mbolism treatment              (2.0-3.0)Venous thrombosis treatmentVenous 
thrombosis prophylaxis (high risk surgery)Prevention of systemic embolism from: 
       Acute myocardial infarction         Valvular heart disease         Atrial
fibrillation Mechanical prosthetic heart valves          (2.5-3.5)





IS PATIENT ON ANTICOAGULANTS? NTHROMBOPLASTIN TIME [id number] 06:32:00* 



             Test Item    Value        Reference Range Interpretation Comments

 

             THROMBOPLASTIN TIME PARTIAL (test code = PTT) 24.0 seconds 25.0-36.

5    L             





IS PATIENT ON ANTICOAGULANTS? [id number] 12:58:00
--------------------------------------------------------------------------------
------------RUN DATE: 19                         [employer]           
              PAGE 1   RUN TIME: 1259                            Specimen Inqui
mel                    RUN USER: INTERFACE                                       
                   ------------------------------------------------------------
--------------------------------PATIENT: REYES,JOSEPHINE               ACCT #: [id number]
[id number] LOC:  [facility]    U #: [id number]                                    
  AGE/SX: 64/F         ROOM: [facility]    RE/15/19REG DR:  Joni Martinez MD          :    53     BED:  7          DIS: 19                 
                     STATUS: DIS IN       TLOC:           --------------------
------------------------------------------------------------------------ SPEC #:
[id number]     RECD:      STATUS:  MASON STAPLES #: [id number]
[id number]                           JENNIFER: 19-         [facility] DR: Anup Gee MD         ENTERED:      SP TYPE: STOMACH        OTHR DR: Piotr Gross i, MD   ORDERED:  GROSS                                         
                                    COPIES TO:   Piotr Fox MD   
[address]   [place] [address] [phone]    Anup Gee MD  
[phone]   [place] [address] [phone] PROCEDURES: GROSS (19-
211) TISSUES:           ANTRAL BIOPSY - H-PYLORI         CLINICAL HISTORY    COL
LECTION DATE: 19       EPIGASTRIC ABDOMINAL PAIN, ESOPHAGITIS         FUAD
L DIAGNOSIS    Stomach, antrum, biopsy:        CHRONIC INACTIVE GASTRITIS, MILD 
      NO DIAGNOSTIC HELICOBACTER IDENTIFIED (GIEMSA STAIN)        NO ULCERATION,
INTESTINAL METAPLASIA, DYSPLASIA OR        MALIGNANCY IDENTIFIED                
ELISEO/aleksey HAMLIN   [id number], [id number]           MACROSCOPIC    The specimen is received in 
formalin, labeled with the patient's name, and   identified as "antrum bx", and 
consists of tan biopsy tissue measuring 0.2 and   0.3 cm, submitted in a single 
cassette.                                        ** CONTINUED ON NEXT PAGE ** 
--------------------------------------------------------------------------------
------------RUN DATE: 19                         [facility]           
              PAGE 2   RUN TIME: 1259                            Specimen Inqui
ry                    RUN USER: INTERFACE                                       
                   ------------------------------------------------------------
--------------------------------SPEC #: [id number]    PATIENT: REYES,JOSEPHSUSANNA PINK                #[id number]  (Continued)------------------------------------
--------------------------------------------------------           MACROSCOPIC  
          (Continued)    GROSS PERFORMED AT [facility]   [address] [place] [address] (p)
[phone]           MICROSCOPIC    All of the stains, including any controls 
performed, stain   appropriately.              MICROSCOPIC PERFORMED AT [facility]   [address] [place] [address]   [phone]         PERFORMING SITE    Diagnosis performed at:   
    [facility]  
     [address] [place] [address] [phone]
--------------------------------------------------------------------------------
------ Signed SIGNATURE ON FILE                        Anna Sneed MD 19
1258     ----------------------------------------------------------------------
----------------------                                                      ** E
ND OF REPORT ** BASIC METABOLIC [id number] 03:32:00* 



             Test Item    Value        Reference Range Interpretation Comments

 

             SODIUM (test code = NA) 141 mmol/L   136-145      N             

 

             POTASSIUM (test code = K) 3.8 mmol/L   3.5-5.1      N             

 

             CHLORIDE (test code = CL) 109.0 mmol/L        H             

 

             CARBON DIOXIDE (test code = CO2) 26.0 mmol/L  21-32        N       

      

 

             ANION GAP (test code = GAP) 9.8          10-20        L            

 

 

             GLUCOSE (test code = GLU) 102 mg/dL           N             

 

             BLOOD UREA NITROGEN (test code = BUN) 3 mg/dL      7-18         L  

           

 

             GLOMERULAR FILTRATION RATE (test code = GFR) > 60 mL/min  >=60     

                 Estimated GFR by

using Modified MDRD formula.Chronic kidney disease is defined as either kidney 
damageor GFR <60 mL/min/1.73 m2 for >3 months.

 

             CREATININE (test code = CREAT) 0.60 mg/dL   0.55-1.02    N         

   **Note change in reference

range due to change in reagent.**

 

             BUN/CREATININE RATIO (test code = BUN/CREA) 5.0          10-20     

   L             

 

             CALCIUM (test code = CA) 8.5 mg/dL    8.5-10.1     N             





[id number] 03:32:00* 



             Test Item    Value        Reference Range Interpretation Comments

 

             MAGNESIUM (test code = MAG) 1.7 mg/dL    1.8-2.4      L            

 





CBC W/AUTO [id number] 02:48:00* 



             Test Item    Value        Reference Range Interpretation Comments

 

             WHITE BLOOD CELL (test code = WBC) 6.8 K/mm3    4.5-12.5     N     

        

 

             RED BLOOD CELL (test code = RBC) 3.88 mill/mm3 3.7-5.2      N      

       

 

             HEMOGLOBIN (test code = HGB) 12.4 gram/dL 11.5-15.5    N           

  

 

             HEMATOCRIT (test code = HCT) 39.5 %       36.0-46.0    N           

  

 

             MEAN CELL VOLUME (test code = MCV) 101.8 fL     80-98        H     

        

 

             MEAN CELL HGB (test code = MCH) 32.0 picogram 27.0-33.0    N       

      

 

             MEAN CELL HGB CONCETRATION (test code = MCHC) 31.4 gram/dL 33.0-36.

0    L             

 

             RED CELL DISTRIBUTION WIDTH (test code = RDW) 13.0 %       11.6-16.

2    N             

 

             RED CELL DISTRIBUTION WIDTH SD (test code = RDW-SD) 48.9 fL      37

.0-51.0    N             

 

             PLATELET COUNT (test code = PLT) 104 K/mm3    150-450      L       

      

 

             MEAN PLATELET VOLUME (test code = MPV) 10.9 fL      6.7-11.0     N 

            

 

             NEUTROPHIL % (test code = NT%) 56.8 %       39.0-69.0    N         

    

 

             IMMATURE GRANULOCYTE % (test code = IG%) 0.1 %        0.0-5.0      

N             

 

             LYMPHOCYTE % (test code = LY%) 32.7 %       25.0-55.0    N         

    

 

             MONOCYTE % (test code = MO%) 8.9 %        0.0-10.0     N           

  

 

             EOSINOPHIL % (test code = EO%) 0.9 %        0.0-5.0      N         

    

 

             BASOPHIL % (test code = BA%) 0.6 %        0.0-1.0      N           

  

 

             NUCLEATED RBC % (test code = NRBC%) 0.0 %        0-0          N    

         

 

             NEUTROPHIL # (test code = NT#) 3.83 K/mm3   1.8-7.7      N         

    

 

             IMMATURE GRANULOCYTE # (test code = IG#) 0.01 x10 3/uL 0-0.03      

 N             

 

             LYMPHOCYTE # (test code = LY#) 2.21 K/mm3   1.0-5.0      N         

    

 

             MONOCYTE # (test code = MO#) 0.60 K/mm3   0-0.8        N           

  

 

             EOSINOPHIL # (test code = EO#) 0.06 K/mm3   0.0-0.5      N         

    

 

             BASOPHIL # (test code = BA#) 0.04 K/mm3   0.0-0.2      N           

  

 

             NUCLEATED RBC # (test code = NRBC#) 0.00 K/mm3   0.0-0.1      N    

         





CBC W/AUTO [id number] 02:39:00* 



             Test Item    Value        Reference Range Interpretation Comments

 

             WHITE BLOOD CELL (test code = WBC)  K/mm3       4.5-12.5           

        

 

             RED BLOOD CELL (test code = RBC)  mill/mm3    3.7-5.2              

      

 

             HEMOGLOBIN (test code = HGB) 12.4 gram/dL 11.5-15.5    N           

  

 

             HEMATOCRIT (test code = HCT) 39.5 %       36.0-46.0    N           

  

 

             MEAN CELL VOLUME (test code = MCV)  fL          80-98              

        

 

             MEAN CELL HGB (test code = MCH)  picogram    27.0-33.0             

     

 

             MEAN CELL HGB CONCETRATION (test code = MCHC)  gram/dL     33.0-36.

0                  

 

             RED CELL DISTRIBUTION WIDTH (test code = RDW)  %           11.6-16.

2                  

 

             RED CELL DISTRIBUTION WIDTH SD (test code = RDW-SD)  fL          37

.0-51.0                  

 

             PLATELET COUNT (test code = PLT)  K/mm3       150-450              

      

 

             MEAN PLATELET VOLUME (test code = MPV)  fL          6.7-11.0       

            

 

             NEUTROPHIL % (test code = NT%)  %           39.0-69.0              

    

 

             IMMATURE GRANULOCYTE % (test code = IG%)  %           0.0-5.0      

              

 

             LYMPHOCYTE % (test code = LY%)  %           25.0-55.0              

    

 

             MONOCYTE % (test code = MO%)  %           0.0-10.0                 

  

 

             EOSINOPHIL % (test code = EO%)  %           0.0-5.0                

    

 

             BASOPHIL % (test code = BA%)  %           0.0-1.0                  

  

 

             NEUTROPHIL # (test code = NT#)  K/mm3       1.8-7.7                

    

 

             LYMPHOCYTE # (test code = LY#)  K/mm3       1.0-5.0                

    

 

             MONOCYTE # (test code = MO#)  K/mm3       0-0.8                    

  

 

             EOSINOPHIL # (test code = EO#)  K/mm3       0.0-0.5                

    

 

             BASOPHIL # (test code = BA#)  K/mm3       0.0-0.2                  

  





BASIC METABOLIC [id number] 03:50:00* 



             Test Item    Value        Reference Range Interpretation Comments

 

             SODIUM (test code = NA) 139 mmol/L   136-145      N             

 

             POTASSIUM (test code = K) 2.9 mmol/L   3.5-5.1      L            Re

sults called to [id number] by 

RAUL 19 0350Critical results verified and read back by Nurse? Y

 

             CHLORIDE (test code = CL) 104.0 mmol/L        N             

 

             CARBON DIOXIDE (test code = CO2) 27.0 mmol/L  21-32        N       

      

 

             ANION GAP (test code = GAP) 10.9         10-20        N            

 

 

             GLUCOSE (test code = GLU) 115 mg/dL           H             

 

             BLOOD UREA NITROGEN (test code = BUN) 4 mg/dL      7-18         L  

           

 

             GLOMERULAR FILTRATION RATE (test code = GFR) > 60 mL/min  >=60     

                 Estimated GFR by

using Modified MDRD formula.Chronic kidney disease is defined as either kidney 
damageor GFR <60 mL/min/1.73 m2 for >3 months.

 

             CREATININE (test code = CREAT) 0.80 mg/dL   0.55-1.02    N         

   **Note change in reference

range due to change in reagent.**

 

             BUN/CREATININE RATIO (test code = BUN/CREA) 5.0          10-20     

   L             

 

             CALCIUM (test code = CA) 8.2 mg/dL    8.5-10.1     L             





HEPATIC FUNCTION [id number] 03:50:00* 



             Test Item    Value        Reference Range Interpretation Comments

 

             TOTAL PROTEIN (test code = PROT) 7.4 gram/dL  6.4-8.2      N       

      

 

             ALBUMIN (test code = ALB) 3.0 g/dL     3.4-5.0      L             

 

             GLOBULIN (test code = GLOB) 4.4 gram/dL  2.7-4.2      H            

 

 

             ALBUMIN/GLOBULIN RATIO (test code = A/G) 0.7          0.75-1.50    

L             

 

             BILIRUBIN TOTAL (test code = BILT) 1.30 mg/dL   0.0-1.0      H     

        

 

             BILIRUBIN DIRECT (test code = BILD) 0.37 mg/dL   0.0-0.20     H    

         

 

             SGOT/AST (test code = AST) 46 IUnit/L   15-37        H             

 

             SGPT/ALT (test code = ALT) 38 IUnit/L   12-78        N             

 

             ALKALINE PHOSPHATASE TOTAL (test code = ALKP) 90 IUnit/L     

     N            **Note change 

in reference range due to change in reagent.**





[id number] 03:50:00* 



             Test Item    Value        Reference Range Interpretation Comments

 

             MAGNESIUM (test code = MAG) 2.0 mg/dL    1.8-2.4      N            

 





CBC W/AUTO [id number] 03:10:00* 



             Test Item    Value        Reference Range Interpretation Comments

 

             WHITE BLOOD CELL (test code = WBC) 8.5 K/mm3    4.5-12.5     N     

        

 

             RED BLOOD CELL (test code = RBC) 3.88 mill/mm3 3.7-5.2      N      

       

 

             HEMOGLOBIN (test code = HGB) 12.5 gram/dL 11.5-15.5    N           

  

 

             HEMATOCRIT (test code = HCT) 38.7 %       36.0-46.0    N           

  

 

             MEAN CELL VOLUME (test code = MCV) 99.7 fL      80-98        H     

        

 

             MEAN CELL HGB (test code = MCH) 32.2 picogram 27.0-33.0    N       

      

 

             MEAN CELL HGB CONCETRATION (test code = MCHC) 32.3 gram/dL 33.0-36.

0    L             

 

             RED CELL DISTRIBUTION WIDTH (test code = RDW) 13.3 %       11.6-16.

2    N             

 

             RED CELL DISTRIBUTION WIDTH SD (test code = RDW-SD) 49.2 fL      37

.0-51.0    N             

 

             PLATELET COUNT (test code = PLT) 133 K/mm3    150-450      L       

      

 

             MEAN PLATELET VOLUME (test code = MPV) 10.8 fL      6.7-11.0     N 

            

 

             NEUTROPHIL % (test code = NT%) 60.6 %       39.0-69.0    N         

    

 

             IMMATURE GRANULOCYTE % (test code = IG%) 0.4 %        0.0-5.0      

N             

 

             LYMPHOCYTE % (test code = LY%) 32.2 %       25.0-55.0    N         

    

 

             MONOCYTE % (test code = MO%) 6.0 %        0.0-10.0     N           

  

 

             EOSINOPHIL % (test code = EO%) 0.2 %        0.0-5.0      N         

    

 

             BASOPHIL % (test code = BA%) 0.6 %        0.0-1.0      N           

  

 

             NUCLEATED RBC % (test code = NRBC%) 0.0 %        0-0          N    

         

 

             NEUTROPHIL # (test code = NT#) 5.14 K/mm3   1.8-7.7      N         

    

 

             IMMATURE GRANULOCYTE # (test code = IG#) 0.03 x10 3/uL 0-0.03      

 N             

 

             LYMPHOCYTE # (test code = LY#) 2.73 K/mm3   1.0-5.0      N         

    

 

             MONOCYTE # (test code = MO#) 0.51 K/mm3   0-0.8        N           

  

 

             EOSINOPHIL # (test code = EO#) 0.02 K/mm3   0.0-0.5      N         

    

 

             BASOPHIL # (test code = BA#) 0.05 K/mm3   0.0-0.2      N           

  

 

             NUCLEATED RBC # (test code = NRBC#) 0.00 K/mm3   0.0-0.1      N    

         

 

             MANUAL DIFF REQUIRED (test code = MDIFF) NO                        

              





CBC W/AUTO [id number] 03:07:00* 



             Test Item    Value        Reference Range Interpretation Comments

 

             WHITE BLOOD CELL (test code = WBC)  K/mm3       4.5-12.5           

        

 

             RED BLOOD CELL (test code = RBC)  mill/mm3    3.7-5.2              

      

 

             HEMOGLOBIN (test code = HGB) 12.5 gram/dL 11.5-15.5    N           

  

 

             HEMATOCRIT (test code = HCT) 38.7 %       36.0-46.0    N           

  

 

             MEAN CELL VOLUME (test code = MCV)  fL          80-98              

        

 

             MEAN CELL HGB (test code = MCH)  picogram    27.0-33.0             

     

 

             MEAN CELL HGB CONCETRATION (test code = MCHC)  gram/dL     33.0-36.

0                  

 

             RED CELL DISTRIBUTION WIDTH (test code = RDW)  %           11.6-16.

2                  

 

             RED CELL DISTRIBUTION WIDTH SD (test code = RDW-SD)  fL          37

.0-51.0                  

 

             PLATELET COUNT (test code = PLT)  K/mm3       150-450              

      

 

             MEAN PLATELET VOLUME (test code = MPV)  fL          6.7-11.0       

            

 

             NEUTROPHIL % (test code = NT%)  %           39.0-69.0              

    

 

             IMMATURE GRANULOCYTE % (test code = IG%)  %           0.0-5.0      

              

 

             LYMPHOCYTE % (test code = LY%)  %           25.0-55.0              

    

 

             MONOCYTE % (test code = MO%)  %           0.0-10.0                 

  

 

             EOSINOPHIL % (test code = EO%)  %           0.0-5.0                

    

 

             BASOPHIL % (test code = BA%)  %           0.0-1.0                  

  

 

             NEUTROPHIL # (test code = NT#)  K/mm3       1.8-7.7                

    

 

             LYMPHOCYTE # (test code = LY#)  K/mm3       1.0-5.0                

    

 

             MONOCYTE # (test code = MO#)  K/mm3       0-0.8                    

  

 

             EOSINOPHIL # (test code = EO#)  K/mm3       0.0-0.5                

    

 

             BASOPHIL # (test code = BA#)  K/mm3       0.0-0.2                  

  





- US ABDOMEN COMPLETE2019-12-15 16:41:00  Name: REYES,JOSEPHINE                 
 [facility]                     : 1955 Age/S: 63  / F         [address]                Unit #: [id number]     Loc:               [place]  [address]              Phys: Kaya Gaston                                
                Acct: [id number]  Dis Date:               Status: ADM IN      
                           PHONE #: [phone]     Exam Date: 12/15/2019  144
                    FAX #: [phone]      Reason: nausea/vomiting            
                        EXAMS:                                               CPT
CODE:      [id number] US ABDOMEN COMPLETE                        [id number]           
        REASON FOR EXAM: nausea/vomiting               EXAM ORDER DATE: 
12/15/2019 9:25 AM               Attending M.D.: Kaya Gaston             
 PROCEDURE:  - US ABDOMEN COMPLETE               Technique: Grayscale and color 
Doppler images of the abdomen.               Comparison study: CT of the abdomen
and pelvis 2019               FINDINGS:                Aorta and 
IVC: Patent and grossly normal in caliber.               Liver:        Size: 
14.0 cm craniocaudally       Parenchyma and contour: Smooth contour.  Normal 
echogenicity.       Cysts and/or masses: None.               Intrahepatic bile 
ducts: No intrahepatic biliary ductal dilation               Common bile duct: 
3.5 mm in diameter.  No echogenic filling defects in       visualized duct.     
         Gallbladder:        Stones/sludge: No intraluminal stones or sludge.   
   Wall: 2.0 mm in thickness.  No discontinuity.  No polyps.  No       
pericholecystic fluid.  No hyperemia.       Sonographic Buckner's sign: Negative 
             Portal vein: Portal vein caliber is within normal limits.  Portal 
vein       is patent with hepatopetal flow.               Pancreas: Incompletely
visualized. However the visualized portions are       grossly within normal 
limits.               Right kidney:       parenchyma echogenicity: Normal 
echogenicity       size: 10.4 x 4.2 x 4.4 cm        stones: none       
cysts/masses: none       hydronephrosis: none              PAGE  1              
        Signed Report                    (CONTINUED)   Name: REYES,JOSEPHINE    
              [facility]                     : 1955 Age/S: 63  / F 
       [address]                Unit #: [id number]     Loc:              
[place]  [address]              Phys: Kaya Gaston  MSN                     
                           Acct: [id number]  Dis Date:               Status: 
ADM IN                                  PHONE #: [phone]     Exam Date: 
12/15/2019  1448                     FAX #: [phone]      Reason: louise
sea/vomiting                                     EXAMS:                         
                     CPT CODE:      [id number] US ABDOMEN COMPLETE               
        [id number]               <Continued>        Left kidney:        parenchyma 
echogenicity: Normal echogenicity       size: 9.5 x 4.2 x 3.9 cm        stones: 
none       cysts/masses: none       hydronephrosis: none                Spleen: 
     size: 8.4 x 2.7 x 2.7 cm       cysts/masses:  Parenchyma is sonographically
unremarkable.               Ascites/pleural effusions: None                 
IMPRESSION:                    Sonographically unremarkable abdomen.            
      Location: [facility]          ** Electronically Signed by Gary Wild MD on 
12/15/2019 at 1641 **                      Reported and signed by: Gary Wild MD                     CC: Kaya Gaston  MSN; Joni Martinez MD            
                                                                                
 Technologist: OCHOA HERNANDEZ                                       [facility] 
Date/Time: 12/15/2019 (164) [id number]                       Orig Print D/T: S:
12/15/2019 (2464)     Probe:                       PAGE  2                      
Signed Report                               - CT ABD PELVIS W/CONT2019-12-15 
00:16:00  Name: REYES,JOSEPHINE                   [facility]                
    : 1955 Age/S: 63  / F         [address]                Unit 
#: [id number]     Loc:               [place]  [address]              Phys: 
Tres Eldridge MD                                                  Acct: 
[id number]  Dis Date:               Status: REG ER                            
     PHONE #: [phone]     Exam Date: 2019  0001                     
FAX #: [phone]      Reason: abd pain n/v                                   
    EXAMS:                                               CPT CODE:      
[id number] CT ABD PELVIS W/CONT                       [address]                    
EXAM:  - CT ABD PELVIS W/CONT               INDICATION: 63 years -old Female 
with abd pain n/v               TECHNIQUE: Contrast -        IV contrast was 
given. No oral contrast was given        Portal venous phase - abdomen and 
pelvis       Delayed phase imaging was obtained through the abdomen and pelvis  
    Reconstructions - coronal and sagittal planes               Automated 
exposure reduction (Auto mA/Smart mA) was utilized in       compliance with ACR 
[employer]                COMPARISON: None               FINDINGS:       
Statements: None.               Thoracic: Included images of the lower chest 
demonstrate no       abnormalities.               Hepatobiliary: The liver is 
normal without focal lesion. The       gallbladder is normal. No biliary 
dilation.               Pancreas: Normal.               Spleen: Normal.         
      Adrenals: Normal.               Genitourinary: The kidneys are normal.  No
evidence of hydronephrosis.        Evaluation of the bladder is limited, but no 
obvious bladder       abnormality is present.               Gastrointestinal: No
bowel obstruction or perienteric inflammation.       The appendix is normal.    
          Vascular: Atherosclerotic calcifications are seen within the aorta and
      branch vessels.                        Bones/Soft Tissues: No acute 
osseous findings. No ventral hernias.               Peritoneum/Other: No 
extraluminal air. No extraluminal fluid.             PAGE  1                    
  Signed Report                    (CONTINUED)   Name: REYES,JOSEPHINE          
        [facility]                     : 1955 Age/S: 63  / F       
 [address] [facility] [address] [facility]                Unit #: [id number]     Loc:               
[place]  [address]              Phys: Tres Eldridge MD                      
                           Acct: [id number]  Dis Date:               Status: 
REG ER                                  PHONE #: [phone]     Exam Date: 
2019  0001                     FAX #: [phone]      Reason: abd pain 
n/v                                        EXAMS:                               
               CPT CODE:      [id number] CT ABD PELVIS W/CONT                    
  [address]               <Continued>          IMPRESSION:                   1.  No 
evidence of bowel obstruction.  No free air or abscess.           ** 
Electronically Signed by Jaimee Galvin MD on 12/15/2019 at 0016 **              
       Reported and signed by: Jaimee Galvin MD                                 
 CC: Tres Eldridge MD                                                         
                                                            Technologist:Piotr Mcgraw, RT(R)(CT)       CTDI:        DLP:        Trnscb Date/Time: 12/15/2019 
(001) t.[email or web address].[email or web address]                       Orig Print D/T: S: 12/15/2019 (001)    
 PAGE  2                       Signed Report                               - XR 
CHEST 1 -12-15 00:08:00 FAX:         Tres Eldridge MD                   
Tensed:    St: REG----------
---------------------------------------------------------------------  Name:   OVIDIO SALAZAR                  [facility]                     : [birth year]  Age/S: 63/F           [address]                Unit #: [id number]    
 Loc: [place]  [address]              Phys: Tres Eldridge MD   
                                              Acct: [id number] Dis Date:      
        Status: REG ER                                 PHONE #: [phone]    
Exam Date:     2019     2349                   FAX #: [phone]     
Reason: Abdominal Pain                                     EXAMS:               
                               CPT CODE:      [id number] XR CHEST 1 V            
                  83928                    DICTATION LOCATION:  8             
 HISTORY:   Female, 63 years of age with Abdominal Pain                EXAM:  
CHEST X-RAY, ONE VIEW               COMPARISON: 3/13/2019               COMMENT:
Frontal view of the chest is provided. Calcified plaque seen       in the aorta.
 No focal infiltrate, consolidation, mass lesion, or       effusion is seen. 
Cardiac silhouette is within normal limits. No acute       bony abnormalities.  
              IMPRESSION:     No acute cardiopulmonary disease.          ** El
ectronically Signed by Pearl Elaine MD on 12/15/2019 at 0008 **                
     Reported and signed by: Pearl Elaine MD                          CC: Tres Roe MD                                                                 
                                                    Technologist: Nader VALDEZ(R); RT TERESO(R)               Trnscrd Date/Time/By: 12/15/2019 (0008
) : By: KortneyCLRUTH           Orig Print D/T: S: 12/15/2019 (0011)                
        PAGE  1                       Signed Report                             
 URINALYSIS [id number] 23:25:00* 



             Test Item    Value        Reference Range Interpretation Comments

 

             UA COLOR (test code = COLU) Light-Yellow YELLOW                    

 

 

             UA APPEARANCE (test code = APPU) CLEAR        CLEAR                

      

 

             UA GLUCOSE DIPSTICK (test code = DGLUU) 500 (3+) mg/dL NEGATIVE    

 A             

 

             UA BILIRUBIN DIPSTICK (test code = BILU) NEGATIVE mg/dL NEGATIVE   

                

 

             UA KETONE DIPSTICK (test code = KETU) 100 (3+) mg/dL NEGATIVE     A

             

 

             UA SPECIFIC GRAVITY (test code = SGU) 1.019        1.001-1.035     

           

 

             UA BLOOD DIPSTICK (test code = CLARE) 0.03 mg/dL (Trace) mg/dL NEGATI

VE     A             

 

             UA PH DIPSTICK (test code = SHAWN) 8.0          5.0-8.0              

      

 

             UA PROTEIN DIPSTICK (test code = PROU) 200 (2+) mg/dL NEGATIVE     

A             

 

             UA UROBILINIOGEN DIPSTICK (test code = URO) Normal mg/dL NEGATIVE  

                 

 

             UA NITRITE DIPSTICK (test code = PRISCA) NEGATIVE     NEGATIVE       

            

 

             UA LEUKOCYTE ESTERASE W REFLEX (test code = LEUUR) NEGATIVE Tree/uL 

NEGATIVE                   

 

             UA WBC (test code = WBCU) 0-5 per HPF  0-5                        

 

             UA RBC (test code = RBCU) 0-2 #/HPF    0-5                        

 

             UA EPITHELIAL CELLS (test code = EPIU) FEW per HPF  FEW            

            

 

             UA BACTERIA (test code = BACU) NONE SEEN #/HPF NONE                

       

 

             UA HYALINE CAST (test code = HYALU) 3-5 #/LPF    0-5               

         





Urine Source? Clean CatchBASIC METABOLIC [id number] 23:13:00* 



             Test Item    Value        Reference Range Interpretation Comments

 

             SODIUM (test code = NA) 137 mmol/L   136-145      N             

 

             POTASSIUM (test code = K) 3.4 mmol/L   3.5-5.1      L             

 

             CHLORIDE (test code = CL) 98.0 mmol/L         N             

 

             CARBON DIOXIDE (test code = CO2)  mmol/L      21-32                

      

 

             ANION GAP (test code = GAP)              10-20                     

 

 

             GLUCOSE (test code = GLU)  mg/dL                            

 

             BLOOD UREA NITROGEN (test code = BUN)  mg/dL       7-18            

           

 

             GLOMERULAR FILTRATION RATE (test code = GFR)  mL/min      >=60     

                  

 

             CREATININE (test code = CREAT)  mg/dL       0.55-1.02              

    

 

             BUN/CREATININE RATIO (test code = BUN/CREA)              10-20     

                 

 

             CALCIUM (test code = CA)  mg/dL       8.5-10.1                   





HEPATIC FUNCTION [id number] 23:13:00* 



             Test Item    Value        Reference Range Interpretation Comments

 

             TOTAL PROTEIN (test code = PROT)  gram/dL     6.4-8.2              

      

 

             ALBUMIN (test code = ALB)  g/dL        3.4-5.0                    

 

             GLOBULIN (test code = GLOB)  gram/dL     2.7-4.2                   

 

 

             ALBUMIN/GLOBULIN RATIO (test code = A/G)              0.75-1.50    

              

 

             BILIRUBIN TOTAL (test code = BILT)  mg/dL       0.0-1.0            

        

 

             BILIRUBIN DIRECT (test code = BILD)  mg/dL       0.0-0.20          

         

 

             SGOT/AST (test code = AST)  IUnit/L     15-37                      

 

             SGPT/ALT (test code = ALT)  IUnit/L     12-78                      

 

             ALKALINE PHOSPHATASE TOTAL (test code = ALKP)  IUnit/L       

                   





[id number] 23:13:00* 



             Test Item    Value        Reference Range Interpretation Comments

 

             LIPASE (test code = LIP)  U/L         73.0-393.0                 





HCG SERUM [id number] 23:13:00* 



             Test Item    Value        Reference Range Interpretation Comments

 

             HCG SERUM QUAL (test code = HCGQL) NEGATIVE     NEGATIVE           

       This HCGQL test is NOT 

applicable for MALE patients.Check with nurse about probable order error.If 
Tumor Marker Test needed, nurse should order test "HCGTU"(Test 
#550.95673)-------------------------------------------------------





ANYRMYUL--30-14 23:13:00* 



             Test Item    Value        Reference Range Interpretation Comments

 

             TROPONIN-I (test code = TROPI)  ng/mL       0-0.045                

    





BASIC METABOLIC [id number] 23:13:00* 



             Test Item    Value        Reference Range Interpretation Comments

 

             SODIUM (test code = NA) 137 mmol/L   136-145      N             

 

             POTASSIUM (test code = K) 3.4 mmol/L   3.5-5.1      L             

 

             CHLORIDE (test code = CL) 98.0 mmol/L         N             

 

             CARBON DIOXIDE (test code = CO2) 24.0 mmol/L  21-32        N       

      

 

             ANION GAP (test code = GAP) 18.4         10-20        N            

 

 

             GLUCOSE (test code = GLU) 210 mg/dL           H             

 

             BLOOD UREA NITROGEN (test code = BUN) 5 mg/dL      7-18         L  

           

 

             GLOMERULAR FILTRATION RATE (test code = GFR) > 60 mL/min  >=60     

                 Estimated GFR by

using Modified MDRD formula.Chronic kidney disease is defined as either kidney 
damageor GFR <60 mL/min/1.73 m2 for >3 months.

 

             CREATININE (test code = CREAT) 0.90 mg/dL   0.55-1.02    N         

   **Note change in reference

range due to change in reagent.**

 

             BUN/CREATININE RATIO (test code = BUN/CREA) 5.3          10-20     

   L             

 

             CALCIUM (test code = CA) 9.8 mg/dL    8.5-10.1     N             





HEPATIC FUNCTION [id number] 23:13:00* 



             Test Item    Value        Reference Range Interpretation Comments

 

             TOTAL PROTEIN (test code = PROT) 10.5 gram/dL 6.4-8.2      H       

      

 

             ALBUMIN (test code = ALB) 4.3 g/dL     3.4-5.0      N             

 

             GLOBULIN (test code = GLOB) 6.2 gram/dL  2.7-4.2      H            

 

 

             ALBUMIN/GLOBULIN RATIO (test code = A/G) 0.7          0.75-1.50    

L             

 

             BILIRUBIN TOTAL (test code = BILT) 1.40 mg/dL   0.0-1.0      H     

        

 

             BILIRUBIN DIRECT (test code = BILD) 0.44 mg/dL   0.0-0.20     H    

         

 

             SGOT/AST (test code = AST) 70 IUnit/L   15-37        H             

 

             SGPT/ALT (test code = ALT) 53 IUnit/L   12-78        N             

 

             ALKALINE PHOSPHATASE TOTAL (test code = ALKP) 127 IUnit/L    

     H            **Note change

in reference range due to change in reagent.**





[id number] 23:13:00* 



             Test Item    Value        Reference Range Interpretation Comments

 

             LIPASE (test code = LIP) 55 U/L       73.0-393.0   L             





HCG SERUM [id number] 23:13:00* 



             Test Item    Value        Reference Range Interpretation Comments

 

             HCG SERUM QUAL (test code = HCGQL) NEGATIVE     NEGATIVE           

       This HCGQL test is NOT 

applicable for MALE patients.Check with nurse about probable order error.If 
Tumor Marker Test needed, nurse should order test "HCGTU"(Test 
#550.59262)-------------------------------------------------------





[id number]--[id number] 23:13:00* 



             Test Item    Value        Reference Range Interpretation Comments

 

             TROPONIN-I (test code = TROPI) <0.015 ng/mL 0-0.045      N         

    





BASIC METABOLIC [id number] 23:02:00* 



             Test Item    Value        Reference Range Interpretation Comments

 

             SODIUM (test code = NA) 137 mmol/L   136-145      N             

 

             POTASSIUM (test code = K) 3.4 mmol/L   3.5-5.1      L             

 

             CHLORIDE (test code = CL) 98.0 mmol/L         N             

 

             CARBON DIOXIDE (test code = CO2)  mmol/L      21-32                

      

 

             ANION GAP (test code = GAP)              10-20                     

 

 

             GLUCOSE (test code = GLU)  mg/dL                            

 

             BLOOD UREA NITROGEN (test code = BUN)  mg/dL       7-18            

           

 

             GLOMERULAR FILTRATION RATE (test code = GFR)  mL/min      >=60     

                  

 

             CREATININE (test code = CREAT)  mg/dL       0.55-1.02              

    

 

             BUN/CREATININE RATIO (test code = BUN/CREA)              10-20     

                 

 

             CALCIUM (test code = CA)  mg/dL       8.5-10.1                   





HEPATIC FUNCTION [id number] 23:02:00* 



             Test Item    Value        Reference Range Interpretation Comments

 

             TOTAL PROTEIN (test code = PROT)  gram/dL     6.4-8.2              

      

 

             ALBUMIN (test code = ALB)  g/dL        3.4-5.0                    

 

             GLOBULIN (test code = GLOB)  gram/dL     2.7-4.2                   

 

 

             ALBUMIN/GLOBULIN RATIO (test code = A/G)              0.75-1.50    

              

 

             BILIRUBIN TOTAL (test code = BILT)  mg/dL       0.0-1.0            

        

 

             BILIRUBIN DIRECT (test code = BILD)  mg/dL       0.0-0.20          

         

 

             SGOT/AST (test code = AST)  IUnit/L     15-37                      

 

             SGPT/ALT (test code = ALT)  IUnit/L     12-78                      

 

             ALKALINE PHOSPHATASE TOTAL (test code = ALKP)  IUnit/L       

                   





[id number] 23:02:00* 



             Test Item    Value        Reference Range Interpretation Comments

 

             LIPASE (test code = LIP)  U/L         73.0-393.0                 





HCG SERUM [id number] 23:02:00* 



             Test Item    Value        Reference Range Interpretation Comments

 

             HCG SERUM QUAL (test code = HCGQL)              NEGATIVE           

        





[id number] 23:02:00* 



             Test Item    Value        Reference Range Interpretation Comments

 

             TROPONIN-I (test code = TROPI)  ng/mL       0-0.045                

    





CBC W/O [id number] 23:00:00* 



             Test Item    Value        Reference Range Interpretation Comments

 

             WHITE BLOOD CELL (test code = WBC)  K/mm3       4.5-12.5           

        

 

             RED BLOOD CELL (test code = RBC)  mill/mm3    3.7-5.2              

      

 

             HEMOGLOBIN (test code = HGB) 14.4 gram/dL 11.5-15.5    N           

  

 

             HEMATOCRIT (test code = HCT) 43.5 %       36.0-46.0    N           

  

 

             MEAN CELL VOLUME (test code = MCV)  fL          80-98              

        

 

             MEAN CELL HGB (test code = MCH)  picogram    27.0-33.0             

     

 

             MEAN CELL HGB CONCETRATION (test code = MCHC)  gram/dL     33.0-36.

0                  

 

             RED CELL DISTRIBUTION WIDTH (test code = RDW)  %           11.6-16.

2                  

 

             PLATELET COUNT (test code = PLT)  K/mm3       150-450              

      

 

             MEAN PLATELET VOLUME (test code = MPV)  fL          6.7-11.0       

            





CBC W/O [id number] 23:00:00* 



             Test Item    Value        Reference Range Interpretation Comments

 

             WHITE BLOOD CELL (test code = WBC) 11.2 K/mm3   4.5-12.5     N     

        

 

             RED BLOOD CELL (test code = RBC) 4.50 mill/mm3 3.7-5.2      N      

       

 

             HEMOGLOBIN (test code = HGB) 14.4 gram/dL 11.5-15.5    N           

  

 

             HEMATOCRIT (test code = HCT) 43.5 %       36.0-46.0    N           

  

 

             MEAN CELL VOLUME (test code = MCV) 96.7 fL      80-98        N     

        

 

             MEAN CELL HGB (test code = MCH) 32.0 picogram 27.0-33.0    N       

      

 

             MEAN CELL HGB CONCETRATION (test code = MCHC) 33.1 gram/dL 33.0-36.

0    N             

 

             RED CELL DISTRIBUTION WIDTH (test code = RDW) 13.5 %       11.6-16.

2    N             

 

             PLATELET COUNT (test code = PLT) 178 K/mm3    150-450      N       

      

 

             MEAN PLATELET VOLUME (test code = MPV) 10.0 fL      6.7-11.0     N 

            





- CT HEAD/BRAIN W/O CONT2019-03-15 18:30:00  Name: REYES,JOSEPHINE              
    [facility]                     : 1955 Age/S: 63  / F         
[address]                Unit #: [id number]     Loc:               
[place]  [address]              Phys: Teresa Meeyr  NP                      
                           Acct: [id number]  Dis Date:               Status: 
ADM IN                                  PHONE #: [phone]     Exam Date: 
03/15/2019  1450                     FAX #: [phone]      Reason: s/p fall  
                                         EXAMS:                                 
             CPT CODE:      [id number] CT HEAD/BRAIN W/O CONT                    
[address]3613                    EXAM: CT of the head;               INFORMATION: 
Headache after fall;               TECHNIQUE AND FINDINGS:               CT dose
reduction protocol;       The ventricles are symmetric and of normal diameter; 
normal width of        basilar cisterns and sulci; normal gray/white matter 
differentiation;       no evidence of intra or extra-axial hemorrhage, mass 
lesion or midline       shift.       There is a round, 6 mm dense calcification 
along the frontal aspect of       the falx. This is consistent with an 
incidental small, calcified       meningioma.       Bone windows show no 
abnormalities. No evidence of skull fracture.       Paranasal sinuses and mastoi
d air cells are well aerated.                 IMPRESSION:                   1. F
cee calcification which is consistent with a small meningioma.         2. Otherw
ise, unremarkable CT scan of the head.                   ** Electronically Tiffani
d by DANNA Anaya **           **             on 03/15/2019 at 1830     
       **                      Reported and signed by: Vincent Anaya M.D.    
            CC: Masoud Garay MD; Teresa Meyer NP                              
                                                                       Techno
logist:Pedro Campos RT(R),(MR),(CT);  CTDI:        DLP:        Trnscb Date/Time
: 03/15/2019 () tCHERRIEGRRUTH                        Orig Print D/T: S: 03/15/20
19 ()       CTDI:          DLP:          PAGE  1                       Tiffani
d Report                               JOYWNNUD--33-14 20:58:00* 



             Test Item    Value        Reference Range Interpretation Comments

 

             TROPONIN-I (test code = TROPI) <0.015 ng/mL 0-0.045      N         

    





DRUGS OF ABUSE SCREEN [id number] 17:47:00* 



             Test Item    Value        Reference Range Interpretation Comments

 

             UA PH DIPSTICK (test code = SHAWN) 8.0          5.0-8.0              

      

 

             URN COCAINE (test code = COCAURN) NEGATIVE     <300 ng/mL          

       

 

             URN CANNABINOIDS (test code = CANNABURN) POSITIVE     <50 ng/mL    

A            This test provides

only a preliminary test result.  A morespecific alternate chemical method must 
be used in order toobtain a confirmed analytical result.  Gas 
chromatography/mass spectrometry (GC/MS) is thepreferred confirmatory method.  
Other chemical confirmationmethods are available.  Clinical consideration and 
professional judgment should be applied to any drug of abusetest result, 
particularly when preliminary positive resultsare used.Unconfirmed screening 
results must not be used fornon-medical purposes (e.g., employment testing, 
legaltesting).

 

             URN AMPHETAMINE (test code = AMPHETURN) NEGATIVE     <1000 ng/mL   

             

 

             URN BARBITURATE (test code = BARBITURN) NEGATIVE     <200 ng/mL    

             

 

             URN BENZODIAZEPINE (test code = BENZOURN) NEGATIVE     <200 ng/mL  

               

 

             URN OPIATES (test code = OPIATURN) NEGATIVE     <300 ng/mL         

        

 

             URN PHENCYCLIDINE (PCP) (test code = PHENCURN) NEGATIVE     <25 ng/

mL                  

 

             URN METHADONE (test code = METHAURN) NEGATIVE     <300 ng/mL       

          





DRUGS OF ABUSE SCREEN [id number] 17:16:00* 



             Test Item    Value        Reference Range Interpretation Comments

 

             UA PH DIPSTICK (test code = SHAWN) 8.0          5.0-8.0              

      

 

             URN COCAINE (test code = COCAURN)              <300 ng/mL          

       

 

             URN CANNABINOIDS (test code = CANNABURN)              <50 ng/mL    

              

 

             URN AMPHETAMINE (test code = AMPHETURN)              <1000 ng/mL   

             

 

             URN BARBITURATE (test code = BARBITURN)              <200 ng/mL    

             

 

             URN BENZODIAZEPINE (test code = BENZOURN)              <200 ng/mL  

               

 

             URN OPIATES (test code = OPIATURN)              <300 ng/mL         

        

 

             URN PHENCYCLIDINE (PCP) (test code = PHENCURN)              <25 ng/

mL                  

 

             URN METHADONE (test code = METHAURN)              <300 ng/mL       

          





LIPID PROFILE (CORONARY RISK)2019 14:32:00* 



             Test Item    Value        Reference Range Interpretation Comments

 

             TRIGLYCERIDES (test code = TRIG) 66 mg/dL            N       

      

 

             CHOLESTEROL (test code = CHOL) 188 mg/dL    0-200        N         

    

 

             CHOLESTEROL/HDL RATIO (test code = CHOLHDL) 1.0 RATIO    0-4.9     

   N            RISK ASSOCIATED 

WITH CHOL/HDL RATIOS:     Risk          Male       Female1/2 AVERAGE        3.43
       3.27AVERAGE            4.97        4.442X AVERAGE         9.55        
7.053X AVERAGE         23.39      11.04 REFERENCE VALUE IS RELATED TO RISK 
LEVELS ASRECOMMENDED BY THE [facility]. HEART, LUNG, AND BLOOD INST.

 

             HDL CHOLESTEROL (test code = HDL) 95 mg/dL     40-60        H      

       

 

             LIPOPROTEIN LDL (test code = LDL) 89 mg/dL     100-129      L      

      

===========================================================Reference Interval:  
       mg/dL          
mmol/L-----------------------------------------------------------Optimal        
             <100           <2.6Near/above optimal          100-129        2.6-
3.3Borderline High             130-159        3.4-4.1High                       
160-189        4.1-4.9Very High                    &gt;=190          
>=4.9========= This LDL result is a direct measurement.=========





THYROID PROFILE [id number] 14:32:00* 



             Test Item    Value        Reference Range Interpretation Comments

 

             T3 UPTAKE (test code = T3UP) 31.0 %       30.0-40.0    N           

  

 

             T4 (THYROXINE) (test code = T4) 8.7 ug/dL    4.5-13.9     N        

     

 

             T7 (FREE THYROXINE INDEX) (test code = T7) 2.69 FTI     1.3-5.1    

  N             

 

             THYROID STIMULATING HORMONE (test code = TSH) 1.770 uIU/mL 0.36-3.7

4    N            TSH 

REFERENCE RANGES:  EUTHYROID:     0.35 - 4.3 mIU/mL                       HYPO  
  :     > 5.5      mIU/mL                       HYPER    :     < 0.35     mIU/mL





[id number]--[id number] 14:32:00* 



             Test Item    Value        Reference Range Interpretation Comments

 

             TROPONIN-I (test code = TROPI) <0.015 ng/mL 0-0.045      N         

    





BASIC METABOLIC [id number] 23:24:00* 



             Test Item    Value        Reference Range Interpretation Comments

 

             SODIUM (test code = NA) 140 mmol/L   136-145      N             

 

             POTASSIUM (test code = K) 3.7 mmol/L   3.5-5.1      N             

 

             CHLORIDE (test code = CL) 107.0 mmol/L        N             

 

             CARBON DIOXIDE (test code = CO2) 23.0 mmol/L  21-32        N       

      

 

             ANION GAP (test code = GAP) 13.7         10-20        N            

 

 

             GLUCOSE (test code = GLU) 119 mg/dL           H             

 

             BLOOD UREA NITROGEN (test code = BUN) 6 mg/dL      7-18         L  

           

 

             GLOMERULAR FILTRATION RATE (test code = GFR) > 60 mL/min  >=60     

                 Estimated GFR by

using Modified MDRD formula.Chronic kidney disease is defined as either kidney 
damageor GFR <60 mL/min/1.73 m2 for >3 months.

 

             CREATININE (test code = CREAT) 0.60 mg/dL   0.55-1.02    N         

   **Note change in reference

range due to change in reagent.**

 

             BUN/CREATININE RATIO (test code = BUN/CREA) 10.4         10-20     

   N             

 

             CALCIUM (test code = CA) 8.7 mg/dL    8.5-10.1     N             





HEPATIC FUNCTION [id number] 23:24:00* 



             Test Item    Value        Reference Range Interpretation Comments

 

             TOTAL PROTEIN (test code = PROT) 8.6 gram/dL  6.4-8.2      H       

      

 

             ALBUMIN (test code = ALB) 3.7 g/dL     3.4-5.0      N             

 

             GLOBULIN (test code = GLOB) 4.9 gram/dL  2.7-4.2      H            

 

 

             ALBUMIN/GLOBULIN RATIO (test code = A/G) 0.8          0.75-1.50    

N             

 

             BILIRUBIN TOTAL (test code = BILT) 0.30 mg/dL   0.0-1.0      N     

        

 

             BILIRUBIN DIRECT (test code = BILD) 0.13 mg/dL   0.0-0.20     N    

         

 

             SGOT/AST (test code = AST) 95 IUnit/L   15-37        H             

 

             SGPT/ALT (test code = ALT) 53 IUnit/L   12-78        N             

 

             ALKALINE PHOSPHATASE TOTAL (test code = ALKP) 117 IUnit/L    

     N            **Note change

in reference range due to change in reagent.**





[id number] 23:24:00* 



             Test Item    Value        Reference Range Interpretation Comments

 

             LIPASE (test code = LIP) 192 U/L      73.0-393.0   N             





[id number] 23:24:00* 



             Test Item    Value        Reference Range Interpretation Comments

 

             TROPONIN-I (test code = TROPI) <0.015 ng/mL 0-0.045      N         

    





BASIC METABOLIC [id number] 23:12:00* 



             Test Item    Value        Reference Range Interpretation Comments

 

             SODIUM (test code = NA) 140 mmol/L   136-145      N             

 

             POTASSIUM (test code = K) 3.7 mmol/L   3.5-5.1      N             

 

             CHLORIDE (test code = CL) 107.0 mmol/L        N             

 

             CARBON DIOXIDE (test code = CO2)  mmol/L      21-32                

      

 

             ANION GAP (test code = GAP)              10-20                     

 

 

             GLUCOSE (test code = GLU)  mg/dL                            

 

             BLOOD UREA NITROGEN (test code = BUN)  mg/dL       7-18            

           

 

             GLOMERULAR FILTRATION RATE (test code = GFR)  mL/min      >=60     

                  

 

             CREATININE (test code = CREAT)  mg/dL       0.55-1.02              

    

 

             BUN/CREATININE RATIO (test code = BUN/CREA)              10-20     

                 

 

             CALCIUM (test code = CA)  mg/dL       8.5-10.1                   





HEPATIC FUNCTION [id number] 23:12:00* 



             Test Item    Value        Reference Range Interpretation Comments

 

             TOTAL PROTEIN (test code = PROT)  gram/dL     6.4-8.2              

      

 

             ALBUMIN (test code = ALB)  g/dL        3.4-5.0                    

 

             GLOBULIN (test code = GLOB)  gram/dL     2.7-4.2                   

 

 

             ALBUMIN/GLOBULIN RATIO (test code = A/G)              0.75-1.50    

              

 

             BILIRUBIN TOTAL (test code = BILT)  mg/dL       0.0-1.0            

        

 

             BILIRUBIN DIRECT (test code = BILD)  mg/dL       0.0-0.20          

         

 

             SGOT/AST (test code = AST)  IUnit/L     15-37                      

 

             SGPT/ALT (test code = ALT)  IUnit/L     12-78                      

 

             ALKALINE PHOSPHATASE TOTAL (test code = ALKP)  IUnit/L       

                   





[id number] 23:12:00* 



             Test Item    Value        Reference Range Interpretation Comments

 

             LIPASE (test code = LIP)  U/L         73.0-393.0                 





[id number] 23:12:00* 



             Test Item    Value        Reference Range Interpretation Comments

 

             TROPONIN-I (test code = TROPI)  ng/mL       0-0.045                

    





CBC W/O [id number] 22:52:00* 



             Test Item    Value        Reference Range Interpretation Comments

 

             WHITE BLOOD CELL (test code = WBC) 4.8 K/mm3    4.5-12.5     N     

        

 

             RED BLOOD CELL (test code = RBC) 3.78 mill/mm3 3.7-5.2      N      

       

 

             HEMOGLOBIN (test code = HGB) 12.3 gram/dL 11.5-15.5    N           

  

 

             HEMATOCRIT (test code = HCT) 38.7 %       36.0-46.0    N           

  

 

             MEAN CELL VOLUME (test code = MCV) 102.4 fL     80-98        H     

        

 

             MEAN CELL HGB (test code = MCH) 32.5 picogram 27.0-33.0    N       

      

 

             MEAN CELL HGB CONCETRATION (test code = MCHC) 31.8 gram/dL 33.0-36.

0    L             

 

             RED CELL DISTRIBUTION WIDTH (test code = RDW) 13.2 %       11.6-16.

2    N             

 

             PLATELET COUNT (test code = PLT) 152 K/mm3    150-450      N       

      

 

             MEAN PLATELET VOLUME (test code = MPV) 10.0 fL      6.7-11.0     N 

            





CBC W/O [id number] 22:51:00* 



             Test Item    Value        Reference Range Interpretation Comments

 

             WHITE BLOOD CELL (test code = WBC)  K/mm3       4.5-12.5           

        

 

             RED BLOOD CELL (test code = RBC)  mill/mm3    3.7-5.2              

      

 

             HEMOGLOBIN (test code = HGB) 12.3 gram/dL 11.5-15.5    N           

  

 

             HEMATOCRIT (test code = HCT) 38.7 %       36.0-46.0    N           

  

 

             MEAN CELL VOLUME (test code = MCV)  fL          80-98              

        

 

             MEAN CELL HGB (test code = MCH)  picogram    27.0-33.0             

     

 

             MEAN CELL HGB CONCETRATION (test code = MCHC)  gram/dL     33.0-36.

0                  

 

             RED CELL DISTRIBUTION WIDTH (test code = RDW)  %           11.6-16.

2                  

 

             PLATELET COUNT (test code = PLT)  K/mm3       150-450              

      

 

             MEAN PLATELET VOLUME (test code = MPV)  fL          6.7-11.0       

            





- XR CHEST 1 -69-15 22:51:00 FAX:         Felix Zendejas DO                 
   Tensed: B   St: PRE----------
---------------------------------------------------------------------  Name:   OVIDIO SALAZAR                  [facility]                     : [birth year]  Age/S: 63/F           [address] [facility]                Unit #: [id number]    
 Loc: [place]  [address]              Phys: Felix Zendejas DO     
                                              Acct: [id number] Dis Date:      
        Status: PRE ER                                 PHONE #: [phone]    
Exam Date:     2019                   FAX #: [phone]     
Reason: CHEST PAIN                                         EXAMS:               
                               CPT CODE:      [id number] XR CHEST 1 V            
                  36000                    EXAM: Chest X-ray, 1 view;           
   CLINICAL HISTORY: Chest pain;               FINDINGS:       The lungs are 
clear,  no infiltrates, no edema;        no effusions; no pneumothorax;       A
ortic calcification; otherwise, unremarkable cardiomediastinal       silhouette.
                IMPRESSION:          No evidence of active cardiopulmonary dise
ase.                             ** Electronically Signed by DANNA yousif **           **             on 2019 at 2251             **              
       Reported and signed by: DANNA Maldonado
C: Felix Zendejas DO                                                             
                                                          Technologist: FABRICIO Malone                         Trnscrd Date/Time/By: 2019
(156) : By: Michelle           Orig Print D/T: S: 2019 (1763)           
             PAGE  1                       Signed Report

## 2020-09-09 NOTE — EMERGENCY DEPARTMENT NOTE
History of Present Illnes


History of Present Illness


Chief Complaint:  Abdominal Complaints


History of Present Illness


This is a 64 year old  female MIDNIGHT SHE BEGAN HAVING DIZZINESS, VOMITING (5-6

TIMES), SEVER HEARTBURN.


   TOOK [facility] AND ITS OT WORKING .


Historian:  Patient


Arrival Mode:  Car


Additional Treatment PTA:  NONE


 Required:  No


Onset (how long ago):  hour(s) (18)


Location:  head


Quality:  dizziness, vomiting


Radiation:  Reports non-radiation


Severity:  severe


Onset quality:  sudden


Duration (how long):  hour(s) (18)


Timing of current episode:  constant


Progression:  unchanged


Chronicity:  new


Context:  Denies recent illness, Denies recent travel


Relieving factors:  none


Exacerbating factors:  movement (of head)


Associated symptoms:  Reports nausea/vomiting, Reports other (heart burn)





Past Medical/Family History


Physician Review


I have reviewed the patient's past medical and family history.  Any updates have

been documented here.





Past Medical History


Recent Fever:  No


Clinical Suspicion of Infectio:  No


New/Unexplained Change in Ment:  No


Past Medical History:  Hypertension, Hepatitis C, GERD


Past Surgical History:  





Social History


Smoking Cessation:  Never Smoker


Alcohol Use:  None


Any Illegal Drug Use:  No





Family History


Family history of heart diseas:  No





Review of Systems


Review of Systems


Constitutional:  Reports no symptoms


EENTM:  Reports no symptoms


Cardiovascular:  Reports no symptoms


Respiratory:  Reports no symptoms


Gastrointestinal:  Reports as per HPI


Genitourinary:  Reports no symptoms


Musculoskeletal:  Reports no symptoms


Integumentary:  Reports no symptoms


Neurological:  Reports as per HPI


Psychological:  Reports no symptoms


Endocrine:  Reports no symptoms


Hematological/Lymphatic:  Reports no symptoms





Physical Exam


Related Data


Allergies:  


Coded Allergies:  


     No Known Allergies (Unverified , 20)


Triage Vital Signs





Vital Signs








  Date Time  Temp Pulse Resp B/P (MAP) Pulse Ox O2 Delivery O2 Flow Rate FiO2


 


20 18:20 98.8 89 18 155/78 99 Room Air  








Vital signs reviewed:  Yes





Physical Exam


CONSTITUTIONAL





Constitutional:  Present well-developed, Present well-nourished


HENT


HENT:  Present normocephalic, Present atraumatic, Present oropharynx 

clear/moist, Present nose normal


HENT L/R:  Present left ext ear normal, Present right ext ear normal


EYES





Eyes:  Reports PERRL, Reports conjunctivae normal, Reports other (horizontal 

nystagmus is present, fast component to the left)


NECK


Neck:  Present ROM normal


PULMONARY


Pulmonary:  Present effort normal, Present breath sounds normal


CARDIOVASCULAR





Cardiovascular:  Present regular rhythm, Present heart sounds normal, Present 

capillary refill normal, Present normal rate


GASTROINTESTINAL





Abdominal:  Present soft, Present nontender, Present bowel sounds normal


GENITOURINARY





Genitourinary:  Present exam deferred


SKIN


Skin:  Present warm, Present dry


MUSCULOSKELETAL





Musculoskeletal:  Present ROM normal


NEUROLOGICAL





Neurological:  Present alert, Present oriented x 3, Present no gross motor or 

sensory deficits, Present other


PSYCHOLOGICAL


Psychological:  Present mood/affect normal, Present judgement normal





Results


Laboratory


Laboratory





Laboratory Tests








Test


 20


18:37


 


White Blood Count


 8.70 x10e3/uL


(4.8-10.8)


 


Red Blood Count


 4.64 x10e6/uL


(3.6-5.1)


 


Hemoglobin


 15.3 g/dL


(12.0-16.0)


 


Hematocrit


 43.4 %


(34.2-44.1)


 


Mean Corpuscular Volume


 93.5 fL


(81-99)


 


Mean Corpuscular Hemoglobin


 33.0 pg


(28-32)


 


Mean Corpuscular Hemoglobin


Concent 35.3 g/dL


(31-35)


 


Red Cell Distribution Width


 12.9 %


(11.7-14.4)


 


Platelet Count


 129 x10e3/uL


(140-360)


 


Neutrophils (%) (Auto)


 71.8 %


(38.7-80.0)


 


Lymphocytes (%) (Auto)


 16.7 %


(18.0-39.1)


 


Monocytes (%) (Auto)


 11.1  %


(4.4-11.3)


 


Eosinophils (%) (Auto)


 0.0 %


(0.0-6.0)


 


Basophils (%) (Auto)


 0.1 %


(0.0-1.0)


 


Neutrophils # (Auto) 6.2 (2.1-6.9) 


 


Lymphocytes # (Auto) 1.5 (1.0-3.2) 


 


Monocytes # (Auto) 1.0 (0.2-0.8) 


 


Eosinophils # (Auto) 0.0 (0.0-0.4) 


 


Basophils # (Auto) 0.0 (0.0-0.1) 


 


Absolute Immature Granulocyte


(auto 0.03 x10e3/uL


(0-0.1)


 


Platelet Estimate


 Slightly


decreased


 


Platelet Morphology Comment Few giant 


 


Red Cell Morphology Comment Normal 


 


Sodium Level


 127 mmol/L


(136-145)


 


Potassium Level


 2.7 mmol/L


(3.5-5.1)


 


Chloride Level


 81 mmol/L


()


 


Carbon Dioxide Level


 24 mmol/L


(22-29)


 


Anion Gap


 24.7 mmol/L


(8-16)


 


Blood Urea Nitrogen


 < 5 mg/dL


(7-26)


 


Creatinine


 0.83 mg/dL


(0.57-1.11)


 


Estimat Glomerular Filtration


Rate > 60 ML/MIN


(60-)


 


BUN/Creatinine Ratio 6 (6-25) 


 


Glucose Level


 177 mg/dL


()


 


Calcium Level


 10.8 mg/dL


(8.4-10.2)


 


Total Bilirubin


 1.8 mg/dL


(0.2-1.2)


 


Aspartate Amino Transf


(AST/SGOT) 65 IU/L (5-34) 





 


Alanine Aminotransferase


(ALT/SGPT) 42 IU/L (0-55) 





 


Alkaline Phosphatase


 93 IU/L


()


 


Creatine Kinase


 136 IU/L


()


 


Creatine Kinase MB


 1.10 ng/mL


(0-5.0)


 


Troponin I


 0.022 ng/mL


(0-0.300)


 


Total Protein


 9.5 g/dL


(6.5-8.1)


 


Albumin


 4.8 g/dL


(3.5-5.0)


 


Globulin


 4.7 g/dL


(2.3-3.5)


 


Albumin/Globulin Ratio 1.0 (0.8-2.0) 


 


Amylase Level


 42 U/L


()


 


Lipase 15 U/L (8-78) 








Lab results reviewed:  Yes





Imaging


Imaging results reviewed:  Yes


Impressions


Procedure: [id number] CT/CT ABDOMEN/PELVIS W


Exam Date: 20                            Exam Time: 








                              REPORT STATUS: Signed





EXAM: CT Abdomen and Pelvis WITH contrast  


INDICATION: ABD PAIN, VOMITING, EVAL FOR SBO, COLITIS, DIVERTICULITIS





COMPARISON: None.


TECHNIQUE: Abdomen and pelvis were scanned utilizing a multidetector helical


scanner from the lung base to the pubic symphysis after administration of IV


contrast. Coronal and sagittal reformations were obtained. Routine protocol was


performed. Scan was performed when during portal venous phase.    


     IV CONTRAST: 100 mL of Isovue 370


     ORAL CONTRAST: None


            


COMPLICATIONS: None





RADIATION DOSE:


     Total DLP: 444.36 mGy*cm


     Estimated effective dose: (DLP x 0.015 x size factor) mSv


     CTDIvol has been reviewed. It is below the limits set by the Radiation


Protocol Committee (RPC).


     Dose modulation, iterative reconstruction, and/or weight based adjustment


of the mA/kV was utilized to reduce the radiation dose to as low as reasonably


achievable. 





FINDINGS:





LINES and TUBES: None.





LOWER THORAX:  Solitary pneumatocele in the left lower lobe. The lung bases and


base of the heart are otherwise normal.





HEPATOBILIARY: The liver is diffuse hypodense compared to the spleen,


consistent with diffuse hepatic diffuse hepatic steatosis.  No focal hepatic


lesions. No biliary ductal dilation. 





GALLBLADDER: No radio-opaque stones or sludge.  No wall thickening.





SPLEEN: No splenomegaly. 





PANCREAS: No focal masses or ductal dilatation.  





ADRENALS: No adrenal nodules    





KIDNEYS/URETERS: Kidneys enhance symmetrically.  No hydronephrosis. No cystic


or solid mass lesions.  No stones.





GI TRACT: There is a small hiatal hernia. There is diverticulosis without


evidence of diverticulitis. No abnormal distention, wall thickening, or


evidence of bowel obstruction.       Appendix is normal.





PELVIC ORGANS/BLADDER: There is a 2.0 x 1.6 cm cyst in the right adnexa. The


urinary bladder is decompressed, however no gross abnormality identified. 





LYMPH NODES: No lymphadenopathy.





VESSELS: Scattered mild arterial vascular calcifications.





PERITONEUM / RETROPERITONEUM: No free air or fluid.





BONES: There are degenerative changes in the spine.





SOFT TISSUES: Unremarkable.            





IMPRESSION: 


1.  No acute abdominopelvic abnormality to explain the patient's symptoms where


identified.


2.  Hepatic steatosis. 


3.  A 2 cm cyst in the right adnexa which can be further evaluated with


dedicated pelvic ultrasound.





Signed by: Delilah Angeles MD on 2020 9:29 PM








Dictated By: DELILAH ANGELES MD


Electronically Signed By: DELILAH ANGELES MD on 20


Transcribed By: KIMI on 20 








COPY TO:   SUSY BISHOP MD~











Procedure: [id number] CT/CT BRAIN WO


Exam Date: 20                            Exam Time: 1900








                              REPORT STATUS: Signed





CT BRAIN WO





HISTORY: Dizziness, vomiting





COMPARISON:  None.





Technique: 


Noncontrast axial scans were obtained from skull base to the vertex.  Coronal


and sagittal reconstructions obtained from the axial data.  One or more of the


following dose reduction techniques were used: Automated exposure control,


adjustment of the mA and/or kV according to patient size, and/or utilization of


iterative reconstruction technique.





DISCUSSION:





Scalp/Skull: Unremarkable.


Brain sulci: Mildly prominent.


Ventricles: Compensatory dilatation.


Extra-axial spaces: No masses or fluid collections. Carotid siphon


calcifications are present.





Parenchyma: 


Mild bilateral deep white matter hypodensity is likely chronic microvascular


ischemic change. 


Otherwise, no masses, hemorrhage, or large vascular territory acute infarct.





Dural sinuses:  No abnormal densities.


Sellar/Suprasellar region: Intact.


Skull base: Intact.


Incidental findings: None.





IMPRESSION:


1.  No acute intracranial abnormalities.


2.  Mild supratentorial chronic microvascular ischemic change. Mild generalized


cerebral volume loss.








Signed by: Dr. Wojciech Gomez M.D. on 2020 7:23 PM








Dictated By: WOJCIECH GOMEZ MD


Electronically Signed By: WOJCIECH GOMEZ MD on 20


Transcribed By: KIMI on 20 








COPY TO:   SUSY BISHOP MD~





Procedures


12 Lead ECG Interpretation


ECG Interpretation :  


   ECG:  ECG 1


   :  Interpreted by ED physician


   Date:  Sep 9, 2020


   Time:  18:43


   Rhythm:  sinus rhythm


   Rate:  normal


   BPM:  92


   QRS axis:  normal


   ST segments normal:  No (non specific changes)


   Other findings:  prolonged QTc interval


   Clinical Impression:  abnormal ECG





Assessment & Plan


Medical Decision Making


MDM


pt with dizziness, n/v and heart burn





cbc, cmp, ekg, cardiac enzymes, ct brain, ua ordered to eval for electrolyte 

abnormality, myocardial infarction, arrhythmia, intracranial abnormality, 








antivert 25 mg po ordered


zofran 4 mg iv ordered


protonix 40 mg iv 


ns 1 liter ns iv bolus ordered





I SPOKE WITH DR CARLOS ROBERSON IN OBS





Assessment & Plan


Final Impression:  


(1) Vertigo


(2) Nausea & vomiting


(3) Hypokalemia


Depart Disposition:  ADMITTED


Last Vital Signs











  Date Time  Temp Pulse Resp B/P (MAP) Pulse Ox O2 Delivery O2 Flow Rate FiO2


 


20 18:20 98.8 89 18 155/78 99 Room Air  








Medications in the ED





Ondansetron HCl 4 mg NOW  STAT IV ;  Start 20 at 18:31;  Stop 20 at 

18:32;  Status UNV


Sodium Chloride 1,000 ml @  999 mls/hr Q1H1M ONCE IV ;  Start 20 at 18:45;  

Stop 20 at 19:45


Meclizine HCl 25 mg ONCE  ONCE PO ;  Start 20 at 18:45;  Stop 20 at 

18:46;  Status UNV











SUSY BISHOP MD         Sep 9, 2020 18:37

## 2020-09-09 NOTE — DIAGNOSTIC IMAGING REPORT
EXAM: CT Abdomen and Pelvis WITH contrast  

INDICATION: ABD PAIN, VOMITING, EVAL FOR SBO, COLITIS, DIVERTICULITIS



COMPARISON: None.

TECHNIQUE: Abdomen and pelvis were scanned utilizing a multidetector helical

scanner from the lung base to the pubic symphysis after administration of IV

contrast. Coronal and sagittal reformations were obtained. Routine protocol was

performed. Scan was performed when during portal venous phase.    

     IV CONTRAST: 100 mL of Isovue 370

     ORAL CONTRAST: None

            

COMPLICATIONS: None



RADIATION DOSE:

     Total DLP: 444.36 mGy*cm

     Estimated effective dose: (DLP x 0.015 x size factor) mSv

     CTDIvol has been reviewed. It is below the limits set by the Radiation

Protocol Committee (RPC).

     Dose modulation, iterative reconstruction, and/or weight based adjustment

of the mA/kV was utilized to reduce the radiation dose to as low as reasonably

achievable. 



FINDINGS:



LINES and TUBES: None.



LOWER THORAX:  Solitary pneumatocele in the left lower lobe. The lung bases and

base of the heart are otherwise normal.



HEPATOBILIARY: The liver is diffuse hypodense compared to the spleen,

consistent with diffuse hepatic diffuse hepatic steatosis.  No focal hepatic

lesions. No biliary ductal dilation. 



GALLBLADDER: No radio-opaque stones or sludge.  No wall thickening.



SPLEEN: No splenomegaly. 



PANCREAS: No focal masses or ductal dilatation.  



ADRENALS: No adrenal nodules    



KIDNEYS/URETERS: Kidneys enhance symmetrically.  No hydronephrosis. No cystic

or solid mass lesions.  No stones.



GI TRACT: There is a small hiatal hernia. There is diverticulosis without

evidence of diverticulitis. No abnormal distention, wall thickening, or

evidence of bowel obstruction.       Appendix is normal.



PELVIC ORGANS/BLADDER: There is a 2.0 x 1.6 cm cyst in the right adnexa. The

urinary bladder is decompressed, however no gross abnormality identified. 



LYMPH NODES: No lymphadenopathy.



VESSELS: Scattered mild arterial vascular calcifications.



PERITONEUM / RETROPERITONEUM: No free air or fluid.



BONES: There are degenerative changes in the spine.



SOFT TISSUES: Unremarkable.            



IMPRESSION: 

1.  No acute abdominopelvic abnormality to explain the patient's symptoms where

identified.

2.  Hepatic steatosis. 

3.  A 2 cm cyst in the right adnexa which can be further evaluated with

dedicated pelvic ultrasound.



Signed by: Chelsea Anthony MD on 9/9/2020 9:29 PM

## 2020-09-09 NOTE — DIAGNOSTIC IMAGING REPORT
CT BRAIN WO



HISTORY: Dizziness, vomiting



COMPARISON:  None.



Technique: 

Noncontrast axial scans were obtained from skull base to the vertex.  Coronal

and sagittal reconstructions obtained from the axial data.  One or more of the

following dose reduction techniques were used: Automated exposure control,

adjustment of the mA and/or kV according to patient size, and/or utilization of

iterative reconstruction technique.



DISCUSSION:



Scalp/Skull: Unremarkable.

Brain sulci: Mildly prominent.

Ventricles: Compensatory dilatation.

Extra-axial spaces: No masses or fluid collections. Carotid siphon

calcifications are present.



Parenchyma: 

Mild bilateral deep white matter hypodensity is likely chronic microvascular

ischemic change. 

Otherwise, no masses, hemorrhage, or large vascular territory acute infarct.



Dural sinuses:  No abnormal densities.

Sellar/Suprasellar region: Intact.

Skull base: Intact.

Incidental findings: None.



IMPRESSION:

1.  No acute intracranial abnormalities.

2.  Mild supratentorial chronic microvascular ischemic change. Mild generalized

cerebral volume loss.





Signed by: Dr. Wojciech Gomez M.D. on 9/9/2020 7:23 PM

## 2020-09-10 VITALS — DIASTOLIC BLOOD PRESSURE: 69 MMHG | SYSTOLIC BLOOD PRESSURE: 107 MMHG

## 2020-09-10 VITALS — DIASTOLIC BLOOD PRESSURE: 90 MMHG | SYSTOLIC BLOOD PRESSURE: 154 MMHG

## 2020-09-10 VITALS — SYSTOLIC BLOOD PRESSURE: 126 MMHG | DIASTOLIC BLOOD PRESSURE: 74 MMHG

## 2020-09-10 VITALS — SYSTOLIC BLOOD PRESSURE: 135 MMHG | DIASTOLIC BLOOD PRESSURE: 70 MMHG

## 2020-09-10 VITALS — SYSTOLIC BLOOD PRESSURE: 150 MMHG | DIASTOLIC BLOOD PRESSURE: 85 MMHG

## 2020-09-10 VITALS — SYSTOLIC BLOOD PRESSURE: 115 MMHG | DIASTOLIC BLOOD PRESSURE: 78 MMHG

## 2020-09-10 LAB
ALBUMIN SERPL-MCNC: 4.5 G/DL (ref 3.5–5)
ALBUMIN/GLOB SERPL: 1 {RATIO} (ref 0.8–2)
ALP SERPL-CCNC: 85 IU/L (ref 40–150)
ALT SERPL-CCNC: 36 IU/L (ref 0–55)
ANION GAP SERPL CALC-SCNC: 18.1 MMOL/L (ref 8–16)
BASOPHILS # BLD AUTO: 0 10*3/UL (ref 0–0.1)
BASOPHILS NFR BLD AUTO: 0.2 % (ref 0–1)
BUN SERPL-MCNC: 5 MG/DL (ref 7–26)
BUN/CREAT SERPL: 6 (ref 6–25)
CALCIUM SERPL-MCNC: 9.4 MG/DL (ref 8.4–10.2)
CHLORIDE SERPL-SCNC: 87 MMOL/L (ref 98–107)
CK MB SERPL-MCNC: 0.9 NG/ML (ref 0–5)
CK MB SERPL-MCNC: 0.9 NG/ML (ref 0–5)
CK SERPL-CCNC: 105 IU/L (ref 29–168)
CK SERPL-CCNC: 95 IU/L (ref 29–168)
CO2 SERPL-SCNC: 28 MMOL/L (ref 22–29)
DEPRECATED NEUTROPHILS # BLD AUTO: 7.1 10*3/UL (ref 2.1–6.9)
EGFRCR SERPLBLD CKD-EPI 2021: > 60 ML/MIN (ref 60–?)
EOSINOPHIL # BLD AUTO: 0 10*3/UL (ref 0–0.4)
EOSINOPHIL NFR BLD AUTO: 0.1 % (ref 0–6)
ERYTHROCYTE [DISTWIDTH] IN CORD BLOOD: 13.2 % (ref 11.7–14.4)
GLOBULIN PLAS-MCNC: 4.3 G/DL (ref 2.3–3.5)
GLUCOSE SERPLBLD-MCNC: 107 MG/DL (ref 74–118)
HCT VFR BLD AUTO: 43.4 % (ref 34.2–44.1)
HGB BLD-MCNC: 14.4 G/DL (ref 12–16)
LYMPHOCYTES # BLD: 3 10*3/UL (ref 1–3.2)
LYMPHOCYTES NFR BLD AUTO: 25.8 % (ref 18–39.1)
MCH RBC QN AUTO: 32.4 PG (ref 28–32)
MCHC RBC AUTO-ENTMCNC: 33.2 G/DL (ref 31–35)
MCV RBC AUTO: 97.5 FL (ref 81–99)
MONOCYTES # BLD AUTO: 1.4 10*3/UL (ref 0.2–0.8)
MONOCYTES NFR BLD AUTO: 11.9 % (ref 4.4–11.3)
NEUTS SEG NFR BLD AUTO: 61.5 % (ref 38.7–80)
PLATELET # BLD AUTO: 119 X10E3/UL (ref 140–360)
POTASSIUM SERPL-SCNC: 3.1 MMOL/L (ref 3.5–5.1)
RBC # BLD AUTO: 4.45 X10E6/UL (ref 3.6–5.1)
SODIUM SERPL-SCNC: 130 MMOL/L (ref 136–145)

## 2020-09-10 NOTE — NUR
PT BROUGHT UP FROM ER IN W/C, ORIENTED TO ROOM, PT IN BED, VS WNL, CALL LIGHT IN REACH, GOWN 
PLACED ON PT, PT KEEPING JUICE DOWN, NO DISTRESS NOTED, WILL CONTINUE TO MONITOR PT

## 2020-09-11 VITALS — DIASTOLIC BLOOD PRESSURE: 86 MMHG | SYSTOLIC BLOOD PRESSURE: 151 MMHG

## 2020-09-11 VITALS — DIASTOLIC BLOOD PRESSURE: 74 MMHG | SYSTOLIC BLOOD PRESSURE: 128 MMHG

## 2020-09-11 VITALS — DIASTOLIC BLOOD PRESSURE: 89 MMHG | SYSTOLIC BLOOD PRESSURE: 139 MMHG

## 2020-09-11 VITALS — SYSTOLIC BLOOD PRESSURE: 151 MMHG | DIASTOLIC BLOOD PRESSURE: 86 MMHG

## 2020-09-11 VITALS — SYSTOLIC BLOOD PRESSURE: 131 MMHG | DIASTOLIC BLOOD PRESSURE: 72 MMHG

## 2020-09-11 VITALS — SYSTOLIC BLOOD PRESSURE: 142 MMHG | DIASTOLIC BLOOD PRESSURE: 93 MMHG

## 2020-09-11 LAB
ANION GAP SERPL CALC-SCNC: 16.7 MMOL/L (ref 8–16)
BUN SERPL-MCNC: 5 MG/DL (ref 7–26)
BUN/CREAT SERPL: 7 (ref 6–25)
CALCIUM SERPL-MCNC: 8.6 MG/DL (ref 8.4–10.2)
CHLORIDE SERPL-SCNC: 97 MMOL/L (ref 98–107)
CO2 SERPL-SCNC: 22 MMOL/L (ref 22–29)
EGFRCR SERPLBLD CKD-EPI 2021: > 60 ML/MIN (ref 60–?)
ERYTHROCYTE [DISTWIDTH] IN CORD BLOOD: 13.1 % (ref 11.7–14.4)
GLUCOSE SERPLBLD-MCNC: 95 MG/DL (ref 74–118)
HCT VFR BLD AUTO: 39.8 % (ref 34.2–44.1)
HGB BLD-MCNC: 13.2 G/DL (ref 12–16)
LYMPHOCYTES NFR BLD MANUAL: 30 % (ref 19–48)
MCH RBC QN AUTO: 32.6 PG (ref 28–32)
MCHC RBC AUTO-ENTMCNC: 33.2 G/DL (ref 31–35)
MCV RBC AUTO: 98.3 FL (ref 81–99)
MONOCYTES NFR BLD MANUAL: 8 % (ref 3.4–9)
NEUTS SEG NFR BLD MANUAL: 62 % (ref 40–74)
PLAT MORPH BLD: NORMAL
PLATELET # BLD AUTO: 111 X10E3/UL (ref 140–360)
PLATELET # BLD EST: (no result) 10*3/UL
POTASSIUM SERPL-SCNC: 3.7 MMOL/L (ref 3.5–5.1)
RBC # BLD AUTO: 4.05 X10E6/UL (ref 3.6–5.1)
RBC MORPH BLD: NORMAL
SODIUM SERPL-SCNC: 132 MMOL/L (ref 136–145)

## 2020-09-11 NOTE — NUR
PATIENT ASSISTED WITH SHOWER, LINENS CHANGED. BACK IN BED, TELEMETRY BOX REAPPLIED. BED IN 
LOWER POSITION, CALL LIGHT AT REACH.

## 2020-09-11 NOTE — OPERATIVE REPORT
DATE OF PROCEDURE:  09/11/2020

 

SURGEON:  Allan Guerrero MD

 

PROCEDURE:  EGD with biopsies and esophageal dilatation.

 

INDICATIONS FOR EGD:  Heartburn, nausea, vomiting, dysphagia.

 

MEDICATIONS:  The patient was done under MAC, please see anesthesiologist's note.

 

PROCEDURE IN DETAIL:  With the patient in left lateral decubitus position, flexible

fiberoptic Olympus gastroscope was introduced into the esophagus under direct

visualization without any difficulty.  The esophagus was diffusely ulcerated and was

dilated to size 50-Burkinan Rodrigues.  The scope was then advanced with ease into the

stomach traversing a small hiatal hernia.  Mucosa overlying the antrum and the body

revealed some patchy erythema and low-grade to moderate edema, and biopsies were

obtained, sent to stain for H pylori.  Pylorus was intubated with ease and the scope was

advanced all the way to the second portion of the duodenum.  The scope was then

withdrawn slowly mucosa overlying the proximal second portion and the duodenal bulb

grossly appeared to be within normal limits.  There was a minute nodule noted that was

umbilicated and the duodenal bulb that was biopsied.  The scope was then withdrawn back

into the stomach and retroflexed mucosa overlying the fundus and cardia appeared to be

within normal limits.  The scope was then straightened out, it was subsequently

withdrawn.  The patient tolerated the procedure well. 

 

IMPRESSION:  

1. Diffusely ulcerated esophagus.

2. Esophagus dilated to size 50-Burkinan Rodrigues.

3. Small hiatal hernia.

4. Gastritis, biopsied, biopsies sent to stain for H pylori.

5. Duodenal bulb nodule, biopsied.

 

PLAN:  Follow up histology.  Initiate Protonix 40 mg one p.o. q.a.m. a.c.

 

 

 

 

______________________________

Allan Guerrero MD

 

[facility] [place]

D:  09/11/2020 15:43:55

T:  09/11/2020 18:33:58

Job #:  [id number]

 

cc:            Henrietta Cordova MD

## 2020-09-11 NOTE — DISCHARGE SUMMARY
HISTORY:  Ms. Reyes is a 64-year-old female with history of hypertension, reflux, who

recently had six months ago EGD and colonoscopy, came to the emergency room complaining

of vomiting and heartburn.  She was seen by GI. She is going to go for EGD today.

Apparently, the patient was discovered that she has also hepatitis C. 

 

PHYSICAL EXAMINATION:

GENERAL:  She is awake and alert. 

VITAL SIGNS:  Temperature is 98, blood pressure is 151/86. 

HEART:  Regular rate. 

LUNGS:  Clear to auscultation. 

ABDOMEN:  Soft.

LABORATORY DATA:  On the blood work, white count is 6.19, hemoglobin 13.2, hematocrit

39.8.  Potassium 3.7, creatinine is 0.72. COVID test came back negative.  Abdominal and

pelvic CT showed hepatic steatosis.  No other findings. 

 

DISCHARGE DIAGNOSES:  

1. Vomiting.

2. Reflux.

3. Hypertension.

4. Hepatic steatosis.

5. Recently diagnosed with hepatitis C.

 

PLAN:  The plan at the present time is to discharge the patient home if EGD is normal

and if it is okay with Dr. Allan Guerrero. Follow up as an outpatient for further

treatment of her hepatitis 

C.  All this was discussed with the patient.  All questions were answered to

satisfaction.  Please see home medication reconciliation list. 

 

 

 

 

______________________________

MD CORAL Childers/DOREEN

D:  09/11/2020 09:10:23

T:  09/11/2020 11:37:56

Job #:  [id number]

## 2020-09-11 NOTE — NUR
PATIENT DISCHARGED HOME. DISCHARGE INSTRUCTIONS, PRESCRIPTION, AND FOLLOW UP GIVEN TO 
PATIENT, SHE VERBALIZED UNDERSTANDING. IV TO RIGHT AC REMOVED WITH TIP INTACT. ALL PERSONAL 
ITEMS TAKEN WITH PATIENT. LEFT UNIT PER WHEEL CHAIR TO FRONT LOBBY IN STABLE CONDITION.

## 2020-09-11 NOTE — NUR
PATIENT SITTING UP IN BED READING HER BOOK, NO COMPLAIN VOICED. REMAINS NPO FOR A PROCEDURE. 
BED IN LOWER POSITION, CALL LIGHT AT REACH.

## 2022-07-27 NOTE — NUR
PATIENT BACK TO UNIT FROM ENDO. REPORT RECEIVED FROM JORGE LUIS RAINES. HAD AN EGD WITH DILATION. 
FINDINGS DIFFUSE ULCERATIVE ESOPHAGITIS. PATIENT DENIED PAIN AT THIS TIME. IN BED WITH CALL 
LIGHT AT REACH. V/S 97.7-73-/93 AND 99% ON RA. Adbry Counseling: I discussed with the patient the risks of tralokinumab including but not limited to eye infection and irritation, cold sores, injection site reactions, worsening of asthma, allergic reactions and increased risk of parasitic infection.  Live vaccines should be avoided while taking tralokinumab. The patient understands that monitoring is required and they must alert us or the primary physician if symptoms of infection or other concerning signs are noted.